# Patient Record
Sex: FEMALE | Race: WHITE | NOT HISPANIC OR LATINO | Employment: FULL TIME | URBAN - METROPOLITAN AREA
[De-identification: names, ages, dates, MRNs, and addresses within clinical notes are randomized per-mention and may not be internally consistent; named-entity substitution may affect disease eponyms.]

---

## 2023-07-21 LAB
CALCIDIOL (25 OH VITAMIN D3) (NG/ML) IN SER/PLAS: 46 NG/ML
COBALAMIN (VITAMIN B12) (PG/ML) IN SER/PLAS: 808 PG/ML (ref 211–911)
THYROTROPIN (MIU/L) IN SER/PLAS BY DETECTION LIMIT <= 0.05 MIU/L: 0.38 MIU/L (ref 0.44–3.98)
THYROXINE (T4) FREE (NG/DL) IN SER/PLAS: 0.91 NG/DL (ref 0.78–1.48)

## 2023-08-17 LAB
BASOPHILS (10*3/UL) IN BLOOD BY AUTOMATED COUNT: 0.02 X10E9/L (ref 0–0.1)
BASOPHILS/100 LEUKOCYTES IN BLOOD BY AUTOMATED COUNT: 0.5 % (ref 0–2)
EOSINOPHILS (10*3/UL) IN BLOOD BY AUTOMATED COUNT: 0.22 X10E9/L (ref 0–0.4)
EOSINOPHILS/100 LEUKOCYTES IN BLOOD BY AUTOMATED COUNT: 5.5 % (ref 0–6)
ERYTHROCYTE DISTRIBUTION WIDTH (RATIO) BY AUTOMATED COUNT: 12.6 % (ref 11.5–14.5)
ERYTHROCYTE MEAN CORPUSCULAR HEMOGLOBIN CONCENTRATION (G/DL) BY AUTOMATED: 32.6 G/DL (ref 32–36)
ERYTHROCYTE MEAN CORPUSCULAR VOLUME (FL) BY AUTOMATED COUNT: 103 FL (ref 80–100)
ERYTHROCYTES (10*6/UL) IN BLOOD BY AUTOMATED COUNT: 3.17 X10E12/L (ref 4–5.2)
HEMATOCRIT (%) IN BLOOD BY AUTOMATED COUNT: 32.5 % (ref 36–46)
HEMOGLOBIN (G/DL) IN BLOOD: 10.6 G/DL (ref 12–16)
IMMATURE GRANULOCYTES/100 LEUKOCYTES IN BLOOD BY AUTOMATED COUNT: 0.5 % (ref 0–0.9)
INR IN PPP BY COAGULATION ASSAY: 1.1 (ref 0.9–1.1)
LEUKOCYTES (10*3/UL) IN BLOOD BY AUTOMATED COUNT: 4 X10E9/L (ref 4.4–11.3)
LYMPHOCYTES (10*3/UL) IN BLOOD BY AUTOMATED COUNT: 1 X10E9/L (ref 0.8–3)
LYMPHOCYTES/100 LEUKOCYTES IN BLOOD BY AUTOMATED COUNT: 25.1 % (ref 13–44)
MONOCYTES (10*3/UL) IN BLOOD BY AUTOMATED COUNT: 0.65 X10E9/L (ref 0.05–0.8)
MONOCYTES/100 LEUKOCYTES IN BLOOD BY AUTOMATED COUNT: 16.3 % (ref 2–10)
NEUTROPHILS (10*3/UL) IN BLOOD BY AUTOMATED COUNT: 2.07 X10E9/L (ref 1.6–5.5)
NEUTROPHILS/100 LEUKOCYTES IN BLOOD BY AUTOMATED COUNT: 52.1 % (ref 40–80)
PLATELETS (10*3/UL) IN BLOOD AUTOMATED COUNT: 148 X10E9/L (ref 150–450)
PROTHROMBIN TIME (PT) IN PPP BY COAGULATION ASSAY: 12.1 SEC (ref 9.8–12.8)

## 2023-08-25 ENCOUNTER — HOSPITAL ENCOUNTER (OUTPATIENT)
Dept: DATA CONVERSION | Facility: HOSPITAL | Age: 73
End: 2023-08-25
Attending: STUDENT IN AN ORGANIZED HEALTH CARE EDUCATION/TRAINING PROGRAM | Admitting: STUDENT IN AN ORGANIZED HEALTH CARE EDUCATION/TRAINING PROGRAM
Payer: COMMERCIAL

## 2023-08-25 DIAGNOSIS — N39.3 STRESS INCONTINENCE (FEMALE) (MALE): ICD-10-CM

## 2023-09-09 LAB — URINE CULTURE: NORMAL

## 2023-09-29 VITALS — BODY MASS INDEX: 28.37 KG/M2 | HEIGHT: 67 IN | WEIGHT: 180.78 LBS

## 2023-09-30 NOTE — H&P
History & Physical Reviewed:   I have reviewed the History and Physical dated:  25-Aug-2023   History and Physical reviewed and relevant findings noted. Patient examined to review pertinent physical  findings.: No significant changes   Home Medications Reviewed: no changes noted   Allergies Reviewed: no changes noted       ERAS (Enhanced Recovery After Surgery):  ·  ERAS Patient: no     Consent:   COVID-19 Consent:  ·  COVID-19 Risk Consent Surgeon has reviewed key risks related to the risk of herman COVID-19 and if they contract COVID-19 what the risks are.     Attestation:   Note Completion:  I am a:  Resident/Fellow   Attending Attestation I saw and evaluated the patient.  I personally obtained the key and critical portions of the history and physical exam or was physically present for key and  critical portions performed by the resident/fellow. I reviewed the resident/fellow?s documentation and discussed the patient with the resident/fellow.  I agree with the resident/fellow?s medical decision making as documented in the note.     I personally evaluated the patient on 25-Aug-2023         Electronic Signatures:  Ana Martínez (Resident))  (Signed 24-Aug-2023 15:24)   Authored: History & Physical Reviewed, ERAS, Consent,  Note Completion  Stefan Jolly)  (Signed 25-Aug-2023 07:46)   Authored: Note Completion   Co-Signer: History & Physical Reviewed, ERAS, Consent, Note Completion      Last Updated: 25-Aug-2023 07:46 by Stefan Jolly)

## 2023-10-01 NOTE — OP NOTE
Post Operative Note:     PreOp Diagnosis: em   Post-Procedure Diagnosis: same   Procedure: 1. sling  2. cystoscopy   Surgeon: maryam   Resident/Fellow/Other Assistant: ofelia   Anesthesia: ga   Estimated Blood Loss (mL): 20   Specimen: no   Findings: normal bladder     Operative Report Dictated:  Dictation: not applicable - note contains Operative  Report   Operative Report:    Two sites were identified with each site 2.5 cm. from the midline at the level of the symphysis pubis. 1% lidocaine with epinephrine was injected vaginally under  the urethra in the vaginal epithelium and bilaterally in the direction of two periurethral tunnels that were about to be created for the sling.  At this point, a 2 cm. sagittal excision was performed vaginally, beginning 1 cm. beneath the urethral meatus.  Sharp dissection was carried out bilaterally using Metzenbaum scissors and periurethral tunnels were created bilaterally.  A Mai catheter with a catheter guide was inserted into the bladder.   The TVT trocars were passed vaginally and retropubically  with bladder deviation to the opposite side until it appeared suprapubically through a stab incision. The catheter was removed.  A cystoscopy was performed, which was entirely normal.  The bladder was drained.   The trocars were brought up entirely suprapubically  and excised.  The end of the mesh was held in place with a Ruth Ann clamp.   The end of the mesh was held in place with a Ruth Ann clamp. Tension was adjusted on the mesh by drawing up on the suprapubic ends with a #8 Hegar dilator maintained between the tape  and the urethra. The plastic sheaths were removed bilaterally.  The excess mesh was excised suprapubically.  The suprapubic sites were closed with interrupted stitches of 4-0 Vicryl suture.  The vaginal incision was closed with a running stitch of 0 Vicryl  suture.       Electronic Signatures:  Stefan Jolly)  (Signed 25-Aug-2023 14:51)   Authored: Post Operative  Note, Note Completion      Last Updated: 25-Aug-2023 14:51 by Stefan Jolly)

## 2023-10-01 NOTE — OP NOTE
Post Operative Note:     PreOp Diagnosis: stress urinary incontinence   Post-Procedure Diagnosis: same   Procedure: midurethral sling, cystoscopy   Surgeon: Dr Stefan Jolly   Resident/Fellow/Other Assistant: Dr. Ana Martínez   Anesthesia: general   I.V. Fluids: Per Anesthesia   Estimated Blood Loss (mL): 0   Blood Replacement: none   Specimen: no   Findings: stress incontinence     Operative Report Dictated:  Dictation: not applicable - note contains Operative  Report   Operative Report:    The patient was interviewed in the preop holding area by the surgical team, where the risks, benefits, and indication of the procedure were reviewed.  She was then  transferred to the operating suite where the patient was properly identified and the procedure was confirmed. When adequate anesthesia was obtained, the patient was prepped and draped in a dorsal lithotomy position. A time-out was performed. All were  in agreement. Preoperative antibiotics were administered. A gonsalez catheter was inserted under sterile conditions and the bladder was drained.    The Lonestar retractor was placed around the perineum. The anterior vaginal wall was infiltrated with lidocaine and a superficial incision starting approximately 1 cm inferior to the urethral meatus was carried out approximately 1.5 cm with a knife. From  superficial to deep, the anterior vaginal wall was then dissected from the tissue surrounding the urethra until the pubic bone was reached. This procedure was repeated on the opposite side. Two small knife holes were then created in the mons pubis just  cephalad and approximately 1.5 cm lateral to the pubic symphysis. The Desara slingtrocars were then inserted through the vaginal incision and were brought out through the mons incisions bilaterally. A cystoscopy was performed and no defects were noted  within the bladder wall. The cystoscope was removed and the mesh sling was then connected to the trocars and pulled through  the dissected space. After the sling was tensioned appropriately, the remainder of the mesh protruding from the mons pubis was  cut, leaving just two puncture holes on the mons pubis.  The anterior vaginal wall was then closed with a 2.0 vicryl suture. Dermabond was placed over the puncture holes located on the mons pubis.    The patient was then awakened from anesthesia, having tolerated procedure well, and was taken to the recovery room in stable condition. All sponge, needle, and instrument counts were correct at the end the case.     Attestation:   Note Completion:  I am a: Resident/Fellow   Attending Attestation I was present for the entire procedure          Electronic Signatures:  Ana Martínez (Resident))  (Signed 25-Aug-2023 14:50)   Authored: Post Operative Note, Note Completion  Stefan Jolly)  (Signed 27-Aug-2023 15:40)   Authored: Note Completion   Co-Signer: Post Operative Note, Note Completion      Last Updated: 27-Aug-2023 15:40 by Stefan Jolly)

## 2023-10-17 ENCOUNTER — APPOINTMENT (OUTPATIENT)
Dept: UROLOGY | Facility: CLINIC | Age: 73
End: 2023-10-17
Payer: COMMERCIAL

## 2023-11-17 ENCOUNTER — OFFICE VISIT (OUTPATIENT)
Dept: UROLOGY | Facility: CLINIC | Age: 73
End: 2023-11-17
Payer: COMMERCIAL

## 2023-11-17 ENCOUNTER — OFFICE VISIT (OUTPATIENT)
Dept: PRIMARY CARE | Facility: CLINIC | Age: 73
End: 2023-11-17
Payer: COMMERCIAL

## 2023-11-17 VITALS
RESPIRATION RATE: 16 BRPM | HEART RATE: 54 BPM | WEIGHT: 178.57 LBS | SYSTOLIC BLOOD PRESSURE: 126 MMHG | BODY MASS INDEX: 27.99 KG/M2 | DIASTOLIC BLOOD PRESSURE: 82 MMHG

## 2023-11-17 DIAGNOSIS — N39.3 SUI (STRESS URINARY INCONTINENCE, FEMALE): Primary | ICD-10-CM

## 2023-11-17 DIAGNOSIS — T45.1X5A ANEMIA DUE TO CHEMOTHERAPY: ICD-10-CM

## 2023-11-17 DIAGNOSIS — D64.81 ANEMIA DUE TO CHEMOTHERAPY: ICD-10-CM

## 2023-11-17 DIAGNOSIS — I10 ESSENTIAL (PRIMARY) HYPERTENSION: ICD-10-CM

## 2023-11-17 DIAGNOSIS — N32.81 OAB (OVERACTIVE BLADDER): ICD-10-CM

## 2023-11-17 DIAGNOSIS — M81.0 AGE-RELATED OSTEOPOROSIS WITHOUT CURRENT PATHOLOGICAL FRACTURE: Primary | ICD-10-CM

## 2023-11-17 DIAGNOSIS — R42 ORTHOSTATIC LIGHTHEADEDNESS: ICD-10-CM

## 2023-11-17 PROBLEM — E55.9 VITAMIN D DEFICIENCY: Status: ACTIVE | Noted: 2023-11-17

## 2023-11-17 PROBLEM — G47.00 INSOMNIA: Status: ACTIVE | Noted: 2023-11-17

## 2023-11-17 PROBLEM — F41.8 DEPRESSION WITH ANXIETY: Status: ACTIVE | Noted: 2023-11-17

## 2023-11-17 PROBLEM — N39.41 URGE INCONTINENCE: Status: ACTIVE | Noted: 2023-11-17

## 2023-11-17 PROBLEM — M54.30 SCIATICA: Status: ACTIVE | Noted: 2023-11-17

## 2023-11-17 PROBLEM — K21.9 GERD WITHOUT ESOPHAGITIS: Status: ACTIVE | Noted: 2023-11-17

## 2023-11-17 PROBLEM — E78.5 HYPERLIPIDEMIA: Status: ACTIVE | Noted: 2023-11-17

## 2023-11-17 PROBLEM — M62.838 MUSCLE SPASM: Status: ACTIVE | Noted: 2023-11-17

## 2023-11-17 PROBLEM — R25.2 LEG CRAMPS: Status: ACTIVE | Noted: 2023-11-17

## 2023-11-17 PROBLEM — M48.061 LUMBAR SPINAL STENOSIS: Status: ACTIVE | Noted: 2023-11-17

## 2023-11-17 PROBLEM — H52.203 ASTIGMATISM, BILATERAL: Status: ACTIVE | Noted: 2023-11-17

## 2023-11-17 PROBLEM — M85.852 OSTEOPENIA OF NECK OF LEFT FEMUR: Status: ACTIVE | Noted: 2023-11-17

## 2023-11-17 PROBLEM — E04.9 GOITER, NODULAR: Status: ACTIVE | Noted: 2023-11-17

## 2023-11-17 PROBLEM — R51.9 HEADACHE: Status: ACTIVE | Noted: 2023-11-17

## 2023-11-17 PROBLEM — E28.39 HYPOESTROGENISM: Status: ACTIVE | Noted: 2023-11-17

## 2023-11-17 PROBLEM — M19.049 HAND ARTHRITIS: Status: ACTIVE | Noted: 2023-11-17

## 2023-11-17 PROBLEM — M17.9 OA (OSTEOARTHRITIS) OF KNEE: Status: ACTIVE | Noted: 2023-11-17

## 2023-11-17 PROBLEM — M54.40 LOW BACK PAIN WITH SCIATICA: Status: ACTIVE | Noted: 2023-11-17

## 2023-11-17 PROBLEM — M17.11 PRIMARY OSTEOARTHRITIS OF RIGHT KNEE: Status: ACTIVE | Noted: 2023-11-17

## 2023-11-17 PROBLEM — M54.41 ACUTE RIGHT-SIDED LOW BACK PAIN WITH RIGHT-SIDED SCIATICA: Status: ACTIVE | Noted: 2023-11-17

## 2023-11-17 PROBLEM — M54.9 BACK PAIN: Status: ACTIVE | Noted: 2023-11-17

## 2023-11-17 PROCEDURE — 99214 OFFICE O/P EST MOD 30 MIN: CPT | Performed by: INTERNAL MEDICINE

## 2023-11-17 PROCEDURE — 99024 POSTOP FOLLOW-UP VISIT: CPT | Performed by: STUDENT IN AN ORGANIZED HEALTH CARE EDUCATION/TRAINING PROGRAM

## 2023-11-17 PROCEDURE — 3079F DIAST BP 80-89 MM HG: CPT | Performed by: INTERNAL MEDICINE

## 2023-11-17 PROCEDURE — 1125F AMNT PAIN NOTED PAIN PRSNT: CPT | Performed by: STUDENT IN AN ORGANIZED HEALTH CARE EDUCATION/TRAINING PROGRAM

## 2023-11-17 PROCEDURE — 3074F SYST BP LT 130 MM HG: CPT | Performed by: INTERNAL MEDICINE

## 2023-11-17 PROCEDURE — 1125F AMNT PAIN NOTED PAIN PRSNT: CPT | Performed by: INTERNAL MEDICINE

## 2023-11-17 RX ORDER — RISEDRONATE SODIUM 35 MG/1
35 TABLET, FILM COATED ORAL
COMMUNITY
Start: 2019-07-18 | End: 2024-01-12 | Stop reason: ALTCHOICE

## 2023-11-17 RX ORDER — LISINOPRIL 40 MG/1
TABLET ORAL
Qty: 90 TABLET | Refills: 1 | Status: SHIPPED | OUTPATIENT
Start: 2023-11-17

## 2023-11-17 RX ORDER — METOPROLOL SUCCINATE 50 MG/1
TABLET, EXTENDED RELEASE ORAL
COMMUNITY
Start: 2023-04-08

## 2023-11-17 RX ORDER — FAMOTIDINE 20 MG/1
20 TABLET, FILM COATED ORAL DAILY
COMMUNITY
End: 2024-01-12

## 2023-11-17 RX ORDER — TRETINOIN 0.25 MG/G
CREAM TOPICAL NIGHTLY
COMMUNITY
Start: 2017-02-06

## 2023-11-17 RX ORDER — AMLODIPINE BESYLATE 10 MG/1
10 TABLET ORAL EVERY 12 HOURS
COMMUNITY
Start: 2023-09-30 | End: 2023-11-17 | Stop reason: ENTERED-IN-ERROR

## 2023-11-17 RX ORDER — AMLODIPINE BESYLATE 5 MG/1
5 TABLET ORAL DAILY
COMMUNITY
End: 2023-11-17 | Stop reason: ENTERED-IN-ERROR

## 2023-11-17 RX ORDER — AMLODIPINE BESYLATE 5 MG/1
5 TABLET ORAL 2 TIMES DAILY
COMMUNITY
End: 2024-03-25 | Stop reason: WASHOUT

## 2023-11-17 RX ORDER — ESCITALOPRAM OXALATE 10 MG/1
10 TABLET ORAL EVERY EVENING
COMMUNITY

## 2023-11-17 RX ORDER — CEPHRADINE 500 MG
CAPSULE ORAL
COMMUNITY
Start: 2019-07-18

## 2023-11-17 RX ORDER — VIT C/E/ZN/COPPR/LUTEIN/ZEAXAN 250MG-90MG
CAPSULE ORAL
COMMUNITY
Start: 2017-06-29

## 2023-11-17 RX ORDER — SOY PROTEIN
POWDER (GRAM) ORAL
COMMUNITY
Start: 2019-03-07

## 2023-11-17 ASSESSMENT — ENCOUNTER SYMPTOMS
DIZZINESS: 1
DEPRESSION: 0
FATIGUE: 1
OCCASIONAL FEELINGS OF UNSTEADINESS: 1
LOSS OF SENSATION IN FEET: 0

## 2023-11-17 ASSESSMENT — PATIENT HEALTH QUESTIONNAIRE - PHQ9
2. FEELING DOWN, DEPRESSED OR HOPELESS: NOT AT ALL
SUM OF ALL RESPONSES TO PHQ9 QUESTIONS 1 AND 2: 0
1. LITTLE INTEREST OR PLEASURE IN DOING THINGS: NOT AT ALL

## 2023-11-17 NOTE — PATIENT INSTRUCTIONS
Please staggered bp meds as follow 6 am and 6 pm amlodipine 5 mg,   9 am and 8 pm bed time metoprolol 25 mg, and ONLY at lunch break and  4 pm,your lisinopril 20 mg with large amount of water.Completebone density in [facility], do not take calcium for 24 before test, after stop your weekly medication. Start slow fe otc daily, prevent constipation with stool softener  Return here in 1m

## 2023-11-17 NOTE — PROGRESS NOTES
CHIEF COMPLAINT:  POV 12 weeks             HISTORY OF PRESENT ILLNESS:   Patient is here for a 12 weeks post-operative visit. Patient complains of having stress incontinence. She states she leaks when she stands up from a seated position. She has no sensation when she needs to urinate. She can be walking around the house and she will just start leaking and cannot stop it. She reports her bladder will empty completely and she does not get any urge. She has started wearing pads and Depends. She tried Myrbetriq with no improvement and she got a dry mouth side effect from using it. Patient noticed the most improvement for her bladder symptoms right after her surgery 12 weeks ago. Patient has finished her chemotherapy treatment. 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                 HISTORY OF PRESENT ILLNESS:           Past Medical History  She has a past medical history of Acute candidiasis of vulva and vagina, Acute vaginitis (04/21/2016), COVID-19 (12/01/2020), Localized swelling, mass and lump, left lower limb (02/15/2019), Other bursal cyst, unspecified site (03/07/2019), Other conditions influencing health status (06/06/2013), Other neuromuscular dysfunction of bladder (06/20/2013), Pain in right knee (12/23/2015), Personal history of diseases of the blood and blood-forming organs and certain disorders involving the immune mechanism (12/10/2015), Personal history of other diseases of the circulatory system (04/07/2016), Personal history of other diseases of the musculoskeletal system and connective tissue (06/20/2013), Personal history of other diseases of the respiratory system (11/18/2020), Personal history of other mental and behavioral disorders (01/14/2021), Personal history of other specified conditions (04/07/2016), Personal history of other specified conditions (12/01/2020), Personal history of other specified conditions (12/10/2015), Personal history of urinary (tract) infections (08/30/2018), Preglaucoma,  "unspecified, bilateral (2015), Presence of spectacles and contact lenses (2015), Primary open-angle glaucoma, bilateral, mild stage (10/12/2015), Primary open-angle glaucoma, bilateral, mild stage (10/12/2015), Primary open-angle glaucoma, bilateral, mild stage (10/12/2015), Primary open-angle glaucoma, bilateral, mild stage (10/13/2015), Primary open-angle glaucoma, unspecified eye, stage unspecified (10/13/2015), and Right lower quadrant pain (2015).    Surgical History  She has a past surgical history that includes  section, classic (2013); Other surgical history (2021); CT guided percutaneous biopsy bone deep (2023); and CT guided percutaneous biopsy bone deep (2/10/2023).     Social History  She reports that she has never smoked. She has never used smokeless tobacco. No history on file for alcohol use and drug use.    Family History  No family history on file.     Allergies  Codeine      A comprehensive 10+ review of systems was negative except for: see hpi                          PHYSICAL EXAMINATION:  BP Readings from Last 3 Encounters:   23 126/82   23 110/60   23 152/76      Wt Readings from Last 3 Encounters:   23 81 kg (178 lb 9.2 oz)   23 83.9 kg (185 lb)   23 83.9 kg (185 lb)      BMI: Estimated body mass index is 27.99 kg/m² as calculated from the following:    Height as of 23: 1.701 m (5' 6.97\").    Weight as of an earlier encounter on 23: 81 kg (178 lb 9.2 oz).  BSA: Estimated body surface area is 1.96 meters squared as calculated from the following:    Height as of 23: 1.701 m (5' 6.97\").    Weight as of an earlier encounter on 23: 81 kg (178 lb 9.2 oz).  HEENT: Normocephalic, atraumatic, PER EOMI, nonicteric, trachea normal, thyroid normal, oropharynx normal.  CARDIAC: regular rate & rhythm, S1 & S2 normal.  No heaves, thrills, gallops or murmurs.  LUNGS: Clear to auscultation, no spinal or CV " tenderness.  EXTREMITIES: No evidence of cyanosis, clubbing or edema.                   Assessment:    73-year-old with mixed incontinence, positive CST     CARISA     -failed botox 100 units, detrol, myrbetriq (STM)  -UDS shows em, normal emptying   recovering well after surgery, having some urinary urge incontinence  No improvement with repeat trial of myrbetriq         Stress incontinence:  S/p sling 8/23, still having issues with UI, no sensation, worse at night  Repeat UDS     Begin timed voiding every 2 - 3 hours  Drink no more than 8 oz. fluids per hour      Stefan Jolly MD

## 2023-12-15 ENCOUNTER — APPOINTMENT (OUTPATIENT)
Dept: UROLOGY | Facility: CLINIC | Age: 73
End: 2023-12-15
Payer: COMMERCIAL

## 2023-12-21 ENCOUNTER — LAB (OUTPATIENT)
Dept: LAB | Facility: HOSPITAL | Age: 73
End: 2023-12-21
Payer: COMMERCIAL

## 2023-12-21 ENCOUNTER — OFFICE VISIT (OUTPATIENT)
Dept: HEMATOLOGY/ONCOLOGY | Facility: HOSPITAL | Age: 73
End: 2023-12-21
Payer: COMMERCIAL

## 2023-12-21 ENCOUNTER — APPOINTMENT (OUTPATIENT)
Dept: UROLOGY | Facility: CLINIC | Age: 73
End: 2023-12-21
Payer: COMMERCIAL

## 2023-12-21 VITALS
DIASTOLIC BLOOD PRESSURE: 72 MMHG | HEIGHT: 66 IN | OXYGEN SATURATION: 99 % | HEART RATE: 59 BPM | TEMPERATURE: 97.2 F | BODY MASS INDEX: 29.17 KG/M2 | SYSTOLIC BLOOD PRESSURE: 155 MMHG | RESPIRATION RATE: 17 BRPM | WEIGHT: 181.5 LBS

## 2023-12-21 DIAGNOSIS — C83.38 DIFFUSE LARGE B-CELL LYMPHOMA OF LYMPH NODES OF MULTIPLE REGIONS (MULTI): ICD-10-CM

## 2023-12-21 DIAGNOSIS — C83.38 DIFFUSE LARGE B-CELL LYMPHOMA OF LYMPH NODES OF MULTIPLE REGIONS (MULTI): Primary | ICD-10-CM

## 2023-12-21 LAB
ALBUMIN SERPL BCP-MCNC: 4.2 G/DL (ref 3.4–5)
ALP SERPL-CCNC: 60 U/L (ref 33–136)
ALT SERPL W P-5'-P-CCNC: 12 U/L (ref 7–45)
ANION GAP SERPL CALC-SCNC: 11 MMOL/L (ref 10–20)
AST SERPL W P-5'-P-CCNC: 13 U/L (ref 9–39)
BASOPHILS # BLD AUTO: 0.02 X10*3/UL (ref 0–0.1)
BASOPHILS NFR BLD AUTO: 0.4 %
BILIRUB SERPL-MCNC: 0.4 MG/DL (ref 0–1.2)
BUN SERPL-MCNC: 18 MG/DL (ref 6–23)
CALCIUM SERPL-MCNC: 9.3 MG/DL (ref 8.6–10.3)
CHLORIDE SERPL-SCNC: 109 MMOL/L (ref 98–107)
CO2 SERPL-SCNC: 25 MMOL/L (ref 21–32)
CREAT SERPL-MCNC: 0.6 MG/DL (ref 0.5–1.05)
EOSINOPHIL # BLD AUTO: 0.25 X10*3/UL (ref 0–0.4)
EOSINOPHIL NFR BLD AUTO: 4.8 %
ERYTHROCYTE [DISTWIDTH] IN BLOOD BY AUTOMATED COUNT: 14.7 % (ref 11.5–14.5)
GFR SERPL CREATININE-BSD FRML MDRD: >90 ML/MIN/1.73M*2
GLUCOSE SERPL-MCNC: 100 MG/DL (ref 74–99)
HCT VFR BLD AUTO: 37.7 % (ref 36–46)
HGB BLD-MCNC: 12.2 G/DL (ref 12–16)
IMM GRANULOCYTES # BLD AUTO: 0.02 X10*3/UL (ref 0–0.5)
IMM GRANULOCYTES NFR BLD AUTO: 0.4 % (ref 0–0.9)
LDH SERPL L TO P-CCNC: 146 U/L (ref 84–246)
LYMPHOCYTES # BLD AUTO: 1.02 X10*3/UL (ref 0.8–3)
LYMPHOCYTES NFR BLD AUTO: 19.5 %
MCH RBC QN AUTO: 29.2 PG (ref 26–34)
MCHC RBC AUTO-ENTMCNC: 32.4 G/DL (ref 32–36)
MCV RBC AUTO: 90 FL (ref 80–100)
MONOCYTES # BLD AUTO: 0.67 X10*3/UL (ref 0.05–0.8)
MONOCYTES NFR BLD AUTO: 12.8 %
NEUTROPHILS # BLD AUTO: 3.24 X10*3/UL (ref 1.6–5.5)
NEUTROPHILS NFR BLD AUTO: 62.1 %
NRBC BLD-RTO: 0 /100 WBCS (ref 0–0)
PLATELET # BLD AUTO: 201 X10*3/UL (ref 150–450)
POTASSIUM SERPL-SCNC: 3.9 MMOL/L (ref 3.5–5.3)
PROT SERPL-MCNC: 6.4 G/DL (ref 6.4–8.2)
RBC # BLD AUTO: 4.18 X10*6/UL (ref 4–5.2)
SODIUM SERPL-SCNC: 141 MMOL/L (ref 136–145)
WBC # BLD AUTO: 5.2 X10*3/UL (ref 4.4–11.3)

## 2023-12-21 PROCEDURE — 3078F DIAST BP <80 MM HG: CPT | Performed by: INTERNAL MEDICINE

## 2023-12-21 PROCEDURE — 36415 COLL VENOUS BLD VENIPUNCTURE: CPT

## 2023-12-21 PROCEDURE — 1126F AMNT PAIN NOTED NONE PRSNT: CPT | Performed by: INTERNAL MEDICINE

## 2023-12-21 PROCEDURE — 99214 OFFICE O/P EST MOD 30 MIN: CPT | Performed by: INTERNAL MEDICINE

## 2023-12-21 PROCEDURE — 85025 COMPLETE CBC W/AUTO DIFF WBC: CPT

## 2023-12-21 PROCEDURE — 3077F SYST BP >= 140 MM HG: CPT | Performed by: INTERNAL MEDICINE

## 2023-12-21 PROCEDURE — 84075 ASSAY ALKALINE PHOSPHATASE: CPT

## 2023-12-21 PROCEDURE — 83615 LACTATE (LD) (LDH) ENZYME: CPT

## 2023-12-21 ASSESSMENT — PAIN SCALES - GENERAL: PAINLEVEL: 0-NO PAIN

## 2023-12-21 ASSESSMENT — ENCOUNTER SYMPTOMS
DEPRESSION: 0
LOSS OF SENSATION IN FEET: 1
OCCASIONAL FEELINGS OF UNSTEADINESS: 0

## 2023-12-21 NOTE — PROGRESS NOTES
Patient ID: Sunni Hearn is a 73 y.o. female.    Subjective    The patient has a long history of joint and back pain, and possible autoimmune disease. In November 2022, she experience pain in the right hip, which was  new. Over the next few months, it progressed to cause moderate to severe pain in the right foot, leg, thigh, and hip, most severely affecting the foot. She was given various treatments, including gabapentin, roboxin, steroid. However, none has helped.  In addition she underwent CT, MRI, and later PET/CT to work up the pain (Imani Llanes, Rajat, Drake). Notably, PET/CT on 1/19/2023 shows multiple FDG avid lymph nodes, soft tissue mass, osseous foci, and spleen. These were suspicious for malignancy,  particularly lymphoma. A biopsy was done on Jan 30, 2023,  on BONE, ILIAC, RIGHT, which was FDG avid. An initial biopsy of the bone was not diagnostic. She subsequently had another biopsy for a preverbebral  mass. Final diagnosis is DLBCL. FISH tests for BCL2 and MYC translocations are pending. Stage IV. IPI 3 (age, extranodal sites, stage).      She initiated therapy with  R-CHOP on 3/3/2023. Had significant nausea and vomiting for the first 3 days post chemo, relieved with iv Zofran. subsequently treatments were well tolerated. No  dose adjustment was needed. C6 was given on 6/16/2023.       today she has neuropathy in her fingertips and bottoms of her feet (not in her toes). She is having difficulty picking up small objects and dropping items. This remains unchanged.     Continues to report urinary incontinence. Was evaluated by Urology who recommended bladder mesh surgery. She has consented to this option and will have the procedure on 8/25.     She had a IT on 8/18. The procedure was not well tolerated, and she is not willing to have more. CSF results shows no evidence of lymphoma.      Today she states she has improved. Numbness in the right foot and fingertips are improving without meds. Denies  "fever, chills, night sweats, weight loss, headaches, visual disturbances, hearing changes,  chest pain, dyspnea, cough, sputum production, hemoptysis, palpitations, syncope/near-syncope, peripheral edema, nausea/vomiting/diarrhea, constipation, abdominal pain, melena, hematochezia, hematuria, noticeable lymphadenopathy, rash, dizziness, and  balance/gait disturbance.     Treatment synopsis: [facility] 3/3 to 6/16/2023            Objective    BSA: 1.97 meters squared  /72   Pulse 59   Temp 36.2 °C (97.2 °F) (Skin)   Resp 17   Ht (S) 1.689 m (5' 6.5\")   Wt 82.3 kg (181 lb 8 oz)   SpO2 99%   BMI 28.86 kg/m²      Physical Exam  Constitutional:       General: She is not in acute distress.     Appearance: She is not toxic-appearing.   HENT:      Head: Normocephalic.      Nose: Nose normal.      Mouth/Throat:      Mouth: Mucous membranes are moist.   Eyes:      Extraocular Movements: Extraocular movements intact.      Pupils: Pupils are equal, round, and reactive to light.   Cardiovascular:      Rate and Rhythm: Normal rate and regular rhythm.      Heart sounds: No murmur heard.  Pulmonary:      Effort: Pulmonary effort is normal.      Breath sounds: Normal breath sounds.   Abdominal:      General: Bowel sounds are normal.      Palpations: Abdomen is soft. There is no mass.      Tenderness: There is no abdominal tenderness. There is no rebound.   Musculoskeletal:         General: No swelling, tenderness, deformity or signs of injury.      Right lower leg: No edema.      Left lower leg: No edema.   Skin:     Coloration: Skin is not jaundiced.      Findings: No bruising, lesion or rash.   Neurological:      Mental Status: She is alert and oriented to person, place, and time.      Cranial Nerves: No cranial nerve deficit.      Motor: No weakness.      Gait: Gait normal.   Psychiatric:         Mood and Affect: Mood normal.         Performance Status:  Asymptomatic      Assessment/Plan   Newly diagnosed lymphoma    "   1-Lymphoma   Final diagnosis is DLBCL. FISH tests for BCL2 and MYC translocations are pending.   Stage IV.   IPI 3 (age, extranodal sites, stage).  Reviewed the results, natural history of DLBCL. stressed it is very treatable, and potentially curable in up to 70% patients.   Discussed PET/CT results. It's a CR. Very encouraging news. Will need follow up PET/CT q6mon.  2-Prognosis  Her cure potential is favorable, based on the above.   3-treatment  The goal is cure. The treatment recommended is R-CHOP x 6 cycles. given q3wks. She will need GCSF and other supportive care.   AEs of drugs explained in details. In particular, the following possible AEs were discussed: severe infection, death, PML, HBV reactivation, cardiac injury, renal injury, mucositis, neuropathy (sensory, motor, autonomic), secondary tumors including leukemia.  These may happen at various frequency. will make best efforts to monitor, prevent, and treat AEs.    c1 was associated with nausea and vomiting. Pt should take zofran for 5 days at the scheduled twice a day doses, instead of the as needed dose schedule.  C2 given on 3/24/2023 was well tolerated. G1 sensory neuropathy was reported. No need to adjust vinca dose.  C3 given 4/14/23  C4 given 5/5/23 Vincristine dose reduced with C4 for grade 2 neuropathy.   C5 given 5/26/23   C6 given 6/16/23  Discussed IT therapy. Overall the AE is rare and is well tolerated. She consented and will start treatment on 8/18 and 9/11. He will need CT head, and labs before these. She received one, but is not willing to have 2nd. CSF on 8/18 was negative, with no evidence of lymphoma.      4-Peripheral neuropathy  -More severe after chemo. Off gabapentin. Improving.         Plan:   -PET/CT in Jan 2024.  -RTC after PET.    Time spent > 35 min, with more than 50% on counseling and coordinating care.    Cancer Staging   No matching staging information was found for the patient.    Oncology History    No history exists.                  Celestino Rawls MD PhD

## 2023-12-28 ENCOUNTER — APPOINTMENT (OUTPATIENT)
Dept: UROLOGY | Facility: CLINIC | Age: 73
End: 2023-12-28
Payer: COMMERCIAL

## 2024-01-04 ENCOUNTER — PROCEDURE VISIT (OUTPATIENT)
Dept: UROLOGY | Facility: CLINIC | Age: 74
End: 2024-01-04
Payer: COMMERCIAL

## 2024-01-04 DIAGNOSIS — N39.41 URGE INCONTINENCE: ICD-10-CM

## 2024-01-04 PROCEDURE — 51728 CYSTOMETROGRAM W/VP: CPT | Performed by: STUDENT IN AN ORGANIZED HEALTH CARE EDUCATION/TRAINING PROGRAM

## 2024-01-04 PROCEDURE — 51741 ELECTRO-UROFLOWMETRY FIRST: CPT | Performed by: STUDENT IN AN ORGANIZED HEALTH CARE EDUCATION/TRAINING PROGRAM

## 2024-01-04 PROCEDURE — 51797 INTRAABDOMINAL PRESSURE TEST: CPT | Performed by: STUDENT IN AN ORGANIZED HEALTH CARE EDUCATION/TRAINING PROGRAM

## 2024-01-04 PROCEDURE — 51784 ANAL/URINARY MUSCLE STUDY: CPT | Performed by: STUDENT IN AN ORGANIZED HEALTH CARE EDUCATION/TRAINING PROGRAM

## 2024-01-04 NOTE — PROGRESS NOTES
Urodynamic Study:  Sunni Hearn identified using 2 forms of identification. A timeout was completed, patient is in the correct position and all safety precautions have been taken.   Risks, Benefits and Alternatives:  Risks, benefits and alternatives were discussed in detail. The patient appears to understand and agrees to proceed. Sunni Hearn has completed, signed and dated the procedure consent form.    Uroflow completed.  Using sterile technique, a 7fr Air Charge Catheter was inserted into the bladder. Rectal catheter inserted approximately 15cm  Bladder filled with normal saline at a rate of 49.9ml/min 297.0 ml of normal saline instilled in bladder prior to voiding.   Results:  Post void residual  (PVR)  volume of 30 ml.      Patient here for urodynamic study. Discussed procedure. Patient has complaints of stress incontinence. She does leak when she stands. Sometimes she has no sensation to void and can empty her bladder with out knowing she is doing so. Performed UDS without difficulty. Patient did leak with stress test. She had discomfort when she reached a strong desire and that is how she knew she had a strong desire to void but said the normal feeling you would get she does not have anymore and it got worse after finishing chemo. Instructed patient to increase fluid intake.

## 2024-01-05 ENCOUNTER — APPOINTMENT (OUTPATIENT)
Dept: HEMATOLOGY/ONCOLOGY | Facility: HOSPITAL | Age: 74
End: 2024-01-05
Payer: COMMERCIAL

## 2024-01-05 ENCOUNTER — ANCILLARY PROCEDURE (OUTPATIENT)
Dept: RADIOLOGY | Facility: CLINIC | Age: 74
End: 2024-01-05
Payer: COMMERCIAL

## 2024-01-05 DIAGNOSIS — C83.38 DIFFUSE LARGE B-CELL LYMPHOMA OF LYMPH NODES OF MULTIPLE REGIONS (MULTI): ICD-10-CM

## 2024-01-05 PROCEDURE — A9552 F18 FDG: HCPCS | Performed by: INTERNAL MEDICINE

## 2024-01-05 PROCEDURE — 78816 PET IMAGE W/CT FULL BODY: CPT | Mod: PET TUMOR SUBSQ TX STRATEGY | Performed by: STUDENT IN AN ORGANIZED HEALTH CARE EDUCATION/TRAINING PROGRAM

## 2024-01-05 PROCEDURE — 3430000001 HC RX 343 DIAGNOSTIC RADIOPHARMACEUTICALS: Performed by: INTERNAL MEDICINE

## 2024-01-05 PROCEDURE — 78815 PET IMAGE W/CT SKULL-THIGH: CPT | Mod: PS

## 2024-01-05 RX ORDER — FLUDEOXYGLUCOSE F 18 200 MCI/ML
13.64 INJECTION, SOLUTION INTRAVENOUS
Status: COMPLETED | OUTPATIENT
Start: 2024-01-05 | End: 2024-01-05

## 2024-01-05 RX ADMIN — FLUDEOXYGLUCOSE F 18 13.64 MILLICURIE: 200 INJECTION, SOLUTION INTRAVENOUS at 11:18

## 2024-01-08 PROBLEM — N39.3 SUI (STRESS URINARY INCONTINENCE, FEMALE): Status: ACTIVE | Noted: 2024-01-04

## 2024-01-12 ENCOUNTER — APPOINTMENT (OUTPATIENT)
Dept: PRIMARY CARE | Facility: CLINIC | Age: 74
End: 2024-01-12
Payer: COMMERCIAL

## 2024-01-12 ENCOUNTER — OFFICE VISIT (OUTPATIENT)
Dept: PRIMARY CARE | Facility: CLINIC | Age: 74
End: 2024-01-12
Payer: COMMERCIAL

## 2024-01-12 VITALS
RESPIRATION RATE: 15 BRPM | HEART RATE: 68 BPM | HEIGHT: 66 IN | SYSTOLIC BLOOD PRESSURE: 130 MMHG | DIASTOLIC BLOOD PRESSURE: 72 MMHG | WEIGHT: 180.78 LBS | OXYGEN SATURATION: 95 % | BODY MASS INDEX: 29.05 KG/M2

## 2024-01-12 DIAGNOSIS — M81.8 OTHER OSTEOPOROSIS WITHOUT CURRENT PATHOLOGICAL FRACTURE: ICD-10-CM

## 2024-01-12 DIAGNOSIS — I10 BENIGN ESSENTIAL HYPERTENSION: Primary | ICD-10-CM

## 2024-01-12 DIAGNOSIS — F41.8 DEPRESSION WITH ANXIETY: ICD-10-CM

## 2024-01-12 DIAGNOSIS — D64.81 ANEMIA DUE TO CHEMOTHERAPY: ICD-10-CM

## 2024-01-12 DIAGNOSIS — Z00.00 WELLNESS EXAMINATION: ICD-10-CM

## 2024-01-12 DIAGNOSIS — T45.1X5A ANEMIA DUE TO CHEMOTHERAPY: ICD-10-CM

## 2024-01-12 PROBLEM — R42 ORTHOSTATIC LIGHTHEADEDNESS: Status: RESOLVED | Noted: 2023-11-17 | Resolved: 2024-01-12

## 2024-01-12 PROCEDURE — 3078F DIAST BP <80 MM HG: CPT | Performed by: INTERNAL MEDICINE

## 2024-01-12 PROCEDURE — G0439 PPPS, SUBSEQ VISIT: HCPCS | Performed by: INTERNAL MEDICINE

## 2024-01-12 PROCEDURE — 1126F AMNT PAIN NOTED NONE PRSNT: CPT | Performed by: INTERNAL MEDICINE

## 2024-01-12 PROCEDURE — 3075F SYST BP GE 130 - 139MM HG: CPT | Performed by: INTERNAL MEDICINE

## 2024-01-12 PROCEDURE — 1170F FXNL STATUS ASSESSED: CPT | Performed by: INTERNAL MEDICINE

## 2024-01-12 PROCEDURE — 99214 OFFICE O/P EST MOD 30 MIN: CPT | Performed by: INTERNAL MEDICINE

## 2024-01-12 RX ORDER — RISEDRONATE SODIUM 35 MG/1
35 TABLET, FILM COATED ORAL
Qty: 4 TABLET | Refills: 1 | Status: SHIPPED | OUTPATIENT
Start: 2024-01-12 | End: 2024-02-05 | Stop reason: ALTCHOICE

## 2024-01-12 ASSESSMENT — ACTIVITIES OF DAILY LIVING (ADL)
BATHING: INDEPENDENT
TAKING_MEDICATION: INDEPENDENT
DOING_HOUSEWORK: INDEPENDENT
DRESSING: INDEPENDENT
MANAGING_FINANCES: INDEPENDENT
GROCERY_SHOPPING: INDEPENDENT

## 2024-01-12 ASSESSMENT — COLUMBIA-SUICIDE SEVERITY RATING SCALE - C-SSRS
2. HAVE YOU ACTUALLY HAD ANY THOUGHTS OF KILLING YOURSELF?: NO
6. HAVE YOU EVER DONE ANYTHING, STARTED TO DO ANYTHING, OR PREPARED TO DO ANYTHING TO END YOUR LIFE?: NO
1. IN THE PAST MONTH, HAVE YOU WISHED YOU WERE DEAD OR WISHED YOU COULD GO TO SLEEP AND NOT WAKE UP?: NO

## 2024-01-12 ASSESSMENT — ENCOUNTER SYMPTOMS
OCCASIONAL FEELINGS OF UNSTEADINESS: 0
BRUISES/BLEEDS EASILY: 0
SHORTNESS OF BREATH: 0
DEPRESSION: 0
AGITATION: 0
PALPITATIONS: 0
BLOOD IN STOOL: 0
SLEEP DISTURBANCE: 0
LOSS OF SENSATION IN FEET: 0

## 2024-01-12 NOTE — PROGRESS NOTES
"Subjective   Patient ID: Sunni Hearn is a 73 y.o. female who presents for Dizziness.bp, new medical issues.    HPI   She is working 2 days / week.  Since last visit in November we decreased and stagered bp meds, currently only 20 mg lisinopril, amlodipine 5 bid and metoprolol suc 25 bid., positional ligheadeness has resolved.  She also started slow fe daily for 2 m, her fatigue is improved but not at base line.  Her distal finger and toes neuropathy is also improving. Her long term memory has decreased.  Pt is concerned about decreased in vision, oncologist consider related to large dose of prednisone. Pending visit in 2 weeks..  [place] for urogyn (sling ) surgery on February.  Lymphoma stable as per most reent PET  in dec 21, 2023    Review of Systems   Respiratory:  Negative for shortness of breath.    Cardiovascular:  Negative for palpitations.   Gastrointestinal:  Negative for blood in stool.        Heartburn asymptomatic with PPI daily   Hematological:  Does not bruise/bleed easily.   Psychiatric/Behavioral:  Negative for agitation and sleep disturbance.        Objective   /72 (BP Location: Right arm, Patient Position: Sitting, BP Cuff Size: Adult)   Pulse 68   Resp 15   Ht 1.676 m (5' 6\")   Wt 82 kg (180 lb 12.4 oz)   SpO2 95%   BMI 29.18 kg/m²     Physical Exam  Eyes - conjunctivae clear, PERRLA  Wearing wick  Neck - no cervical lymphadenopathy, no thyromegaly  Axilla - no palpable lymphadenopathy  Cardiac- regular rate and rhythm, no murmurs, no carotid bruit, no JVP  Lung - clear to auscultation, no rales, no rhonchi, no wheezing  GI - normally active bowel sounds, non tender, non distended, no hepatosplenomegaly, no rebound  MSK - non deformities  Extremities - no edema, good distal pulses  Neuro - bilateral thumb numbness, , oriented x 3  Skin - no bruises, no rashes  Psychiatric - pleasant, well groom, no hallucinations      Assessment/Plan   Problem List Items Addressed This Visit       "       ICD-10-CM       Cardiac and Vasculature    Benign essential hypertension - Primary I10     Continue current meds, stagering them in 24 h            Endocrine/Metabolic    Other osteoporosis without current pathological fracture M81.8     Refill biphosphonates til she complete dexa, then she is aware to stop         Relevant Medications    risedronate (ActoneL) 35 mg tablet       Health Encounters    Wellness examination Z00.00     Discussed diet, exercise, immunizations, and screening appropriate for their age.  Colonoscopy: 2020 next in 10 years ?  Dexa: pending before stop biphosphonates.  Mammogram: August 2023                Hematology and Neoplasia    Anemia due to chemotherapy D64.81, T45.1X5A    Relevant Orders    Iron and TIBC    Ferritin       Mental Health    Depression with anxiety F41.8     Stable continue re eval in 6m

## 2024-01-12 NOTE — ASSESSMENT & PLAN NOTE
>>ASSESSMENT AND PLAN FOR BENIGN ESSENTIAL HYPERTENSION WRITTEN ON 1/12/2024 12:36 PM BY YOAN DOAN MD    Continue current meds, stagering them in 24 h

## 2024-01-12 NOTE — ASSESSMENT & PLAN NOTE
Discussed diet, exercise, immunizations, and screening appropriate for their age.  Colonoscopy: 2020 next in 10 years ?  Dexa: pending before stop biphosphonates.  Mammogram: August 2023

## 2024-01-13 DIAGNOSIS — U07.1 COVID: Primary | ICD-10-CM

## 2024-01-13 RX ORDER — NIRMATRELVIR AND RITONAVIR 150-100 MG
2 KIT ORAL 2 TIMES DAILY
Qty: 20 TABLET | Refills: 0 | Status: SHIPPED | OUTPATIENT
Start: 2024-01-13 | End: 2024-01-18

## 2024-01-13 NOTE — PROGRESS NOTES
73-year-old female called Saturday morning tested positive for COVID she has high fever she has been vaccinated and boosted she has a history of non-Hodgkin's lymphoma in remission and hypertension.  We talked about the side effects of Paxlovid and she is willing to start.  I also made her primary care aware since she was just in to see her

## 2024-01-19 ENCOUNTER — APPOINTMENT (OUTPATIENT)
Dept: HEMATOLOGY/ONCOLOGY | Facility: HOSPITAL | Age: 74
End: 2024-01-19
Payer: COMMERCIAL

## 2024-01-24 ENCOUNTER — OFFICE VISIT (OUTPATIENT)
Dept: OPHTHALMOLOGY | Facility: CLINIC | Age: 74
End: 2024-01-24
Payer: COMMERCIAL

## 2024-01-24 DIAGNOSIS — H52.203 MYOPIA OF BOTH EYES WITH ASTIGMATISM AND PRESBYOPIA: ICD-10-CM

## 2024-01-24 DIAGNOSIS — H25.043 POSTERIOR SUBCAPSULAR POLAR AGE-RELATED CATARACT, BILATERAL: ICD-10-CM

## 2024-01-24 DIAGNOSIS — H40.1131 PRIMARY OPEN ANGLE GLAUCOMA (POAG) OF BOTH EYES, MILD STAGE: Primary | ICD-10-CM

## 2024-01-24 DIAGNOSIS — H52.13 MYOPIA OF BOTH EYES WITH ASTIGMATISM AND PRESBYOPIA: ICD-10-CM

## 2024-01-24 DIAGNOSIS — H52.4 MYOPIA OF BOTH EYES WITH ASTIGMATISM AND PRESBYOPIA: ICD-10-CM

## 2024-01-24 PROBLEM — H40.1111 PRIMARY OPEN-ANGLE GLAUCOMA, RIGHT EYE, MILD STAGE: Status: ACTIVE | Noted: 2024-01-24

## 2024-01-24 PROCEDURE — 92133 CPTRZD OPH DX IMG PST SGM ON: CPT | Performed by: OPTOMETRIST

## 2024-01-24 PROCEDURE — 92015 DETERMINE REFRACTIVE STATE: CPT | Performed by: OPTOMETRIST

## 2024-01-24 PROCEDURE — 92014 COMPRE OPH EXAM EST PT 1/>: CPT | Performed by: OPTOMETRIST

## 2024-01-24 RX ORDER — BRIMONIDINE TARTRATE 2 MG/ML
1 SOLUTION/ DROPS OPHTHALMIC 2 TIMES DAILY
Qty: 15 ML | Refills: 11 | Status: SHIPPED | OUTPATIENT
Start: 2024-01-24 | End: 2024-05-03 | Stop reason: WASHOUT

## 2024-01-24 ASSESSMENT — ENCOUNTER SYMPTOMS
PSYCHIATRIC NEGATIVE: 0
CONSTITUTIONAL NEGATIVE: 0
NEUROLOGICAL NEGATIVE: 0
EYES NEGATIVE: 0
RESPIRATORY NEGATIVE: 0
ALLERGIC/IMMUNOLOGIC NEGATIVE: 1
GASTROINTESTINAL NEGATIVE: 0
ENDOCRINE NEGATIVE: 0
HEMATOLOGIC/LYMPHATIC NEGATIVE: 0
MUSCULOSKELETAL NEGATIVE: 0
CARDIOVASCULAR NEGATIVE: 1

## 2024-01-24 ASSESSMENT — REFRACTION_WEARINGRX
OS_ADD: +2.50
OD_CYLINDER: -0.50
OD_ADD: +2.50
OD_SPHERE: -2.50
OS_AXIS: 110
OD_AXIS: 025
OS_SPHERE: -0.75
OS_CYLINDER: -1.00

## 2024-01-24 ASSESSMENT — PACHYMETRY
OD_CT(UM): 594
OS_CT(UM): 585

## 2024-01-24 ASSESSMENT — REFRACTION_MANIFEST
OD_SPHERE: -5.25
OS_ADD: +2.50
OS_CYLINDER: -0.75
OD_ADD: +2.50
OS_SPHERE: -3.00
OS_AXIS: 135
OD_CYLINDER: SPHERE

## 2024-01-24 ASSESSMENT — VISUAL ACUITY
OS_PH_CC: 20/40
OD_BAT_HIGH: 20/40
OD_PH_CC: 20/40
CORRECTION_TYPE: GLASSES
OD_CC: 20/60
METHOD: SNELLEN - LINEAR
OS_PH_CC+: +1
OD_PH_CC+: +2
OS_CC+: +1
OS_CC: 20/50
OS_BAT_HIGH: 20/30

## 2024-01-24 ASSESSMENT — CUP TO DISC RATIO
OS_RATIO: .7
OD_RATIO: .7

## 2024-01-24 ASSESSMENT — CONF VISUAL FIELD
OS_INFERIOR_TEMPORAL_RESTRICTION: 0
OS_INFERIOR_NASAL_RESTRICTION: 0
OS_SUPERIOR_NASAL_RESTRICTION: 0
OS_SUPERIOR_TEMPORAL_RESTRICTION: 0

## 2024-01-24 ASSESSMENT — EXTERNAL EXAM - LEFT EYE: OS_EXAM: NORMAL

## 2024-01-24 ASSESSMENT — TONOMETRY
OD_IOP_MMHG: 18
IOP_METHOD: GOLDMANN APPLANATION
OS_IOP_MMHG: 18

## 2024-01-24 ASSESSMENT — EXTERNAL EXAM - RIGHT EYE: OD_EXAM: NORMAL

## 2024-01-24 NOTE — H&P (VIEW-ONLY)
POAG OD. Brimonidine 0.2% OU QD and using intermittently. Had bradycardia from timolol. IOP 16/19 Tmax 18/18 pachy adjust -2 OU. Has stopped steroids for cancer treatment.    Optical coherence tomography of the retinal nerve fiber layer (RNFL) revealed:    OD: Reduced thickness in superior sectors with an average RNFL thickness of 76 was 78 micron.   OS: Reduced thickness in superior sectors with an average RNFL thickness of 75 was 77 micron.   A Beavers 24-2 threshold visual field test was done.  Results were:   OD: the absence of scotoma, pattern standard deviation 2.20 dB   OS: the absence of scotoma, pattern standard deviation 3.01 dB  PSC cataracts worsening. VA corrects to 20/30 OD and 20/20 OS. BAT 20/40 OD 20/30 OS. A spectacle prescription was dispensed to be used as needed. Recommend to defer getting new glasses. Patient drives to work [place] and [place].   Hx of plaquenil therapy but DCd and no need for testing.   Non hodgkin lymphoma in remission.   Dermatochalasis OS.OD and pt is symptomatic.   Per patient request get glasses and not spend a lot of money on them had large refractive change and use only for driving.    RTc 4 months for manifest refraction (MR) and BAT and IOP and OCT RNFL

## 2024-01-25 ENCOUNTER — LAB (OUTPATIENT)
Dept: LAB | Facility: HOSPITAL | Age: 74
End: 2024-01-25
Payer: COMMERCIAL

## 2024-01-25 ENCOUNTER — OFFICE VISIT (OUTPATIENT)
Dept: HEMATOLOGY/ONCOLOGY | Facility: HOSPITAL | Age: 74
End: 2024-01-25
Payer: COMMERCIAL

## 2024-01-25 VITALS
OXYGEN SATURATION: 99 % | HEART RATE: 75 BPM | BODY MASS INDEX: 29.14 KG/M2 | RESPIRATION RATE: 20 BRPM | TEMPERATURE: 97.9 F | DIASTOLIC BLOOD PRESSURE: 77 MMHG | SYSTOLIC BLOOD PRESSURE: 154 MMHG | WEIGHT: 180.56 LBS

## 2024-01-25 DIAGNOSIS — C83.38 DIFFUSE LARGE B-CELL LYMPHOMA OF LYMPH NODES OF MULTIPLE REGIONS (MULTI): ICD-10-CM

## 2024-01-25 DIAGNOSIS — D64.81 ANEMIA DUE TO CHEMOTHERAPY: ICD-10-CM

## 2024-01-25 DIAGNOSIS — T45.1X5A ANEMIA DUE TO CHEMOTHERAPY: ICD-10-CM

## 2024-01-25 LAB
ALBUMIN SERPL BCP-MCNC: 4.2 G/DL (ref 3.4–5)
ALP SERPL-CCNC: 63 U/L (ref 33–136)
ALT SERPL W P-5'-P-CCNC: 12 U/L (ref 7–45)
ANION GAP SERPL CALC-SCNC: 11 MMOL/L (ref 10–20)
AST SERPL W P-5'-P-CCNC: 12 U/L (ref 9–39)
BASOPHILS # BLD AUTO: 0.03 X10*3/UL (ref 0–0.1)
BASOPHILS NFR BLD AUTO: 0.6 %
BILIRUB SERPL-MCNC: 0.3 MG/DL (ref 0–1.2)
BUN SERPL-MCNC: 21 MG/DL (ref 6–23)
CALCIUM SERPL-MCNC: 9.6 MG/DL (ref 8.6–10.3)
CHLORIDE SERPL-SCNC: 107 MMOL/L (ref 98–107)
CO2 SERPL-SCNC: 26 MMOL/L (ref 21–32)
CREAT SERPL-MCNC: 0.6 MG/DL (ref 0.5–1.05)
EGFRCR SERPLBLD CKD-EPI 2021: >90 ML/MIN/1.73M*2
EOSINOPHIL # BLD AUTO: 0.27 X10*3/UL (ref 0–0.4)
EOSINOPHIL NFR BLD AUTO: 5.4 %
ERYTHROCYTE [DISTWIDTH] IN BLOOD BY AUTOMATED COUNT: 14.3 % (ref 11.5–14.5)
FERRITIN SERPL-MCNC: 49 NG/ML (ref 8–150)
GLUCOSE SERPL-MCNC: 134 MG/DL (ref 74–99)
HCT VFR BLD AUTO: 36.7 % (ref 36–46)
HGB BLD-MCNC: 11.8 G/DL (ref 12–16)
IMM GRANULOCYTES # BLD AUTO: 0.04 X10*3/UL (ref 0–0.5)
IMM GRANULOCYTES NFR BLD AUTO: 0.8 % (ref 0–0.9)
IRON SATN MFR SERPL: 19 % (ref 25–45)
IRON SERPL-MCNC: 78 UG/DL (ref 35–150)
LDH SERPL L TO P-CCNC: 145 U/L (ref 84–246)
LYMPHOCYTES # BLD AUTO: 1.02 X10*3/UL (ref 0.8–3)
LYMPHOCYTES NFR BLD AUTO: 20.2 %
MCH RBC QN AUTO: 28.6 PG (ref 26–34)
MCHC RBC AUTO-ENTMCNC: 32.2 G/DL (ref 32–36)
MCV RBC AUTO: 89 FL (ref 80–100)
MONOCYTES # BLD AUTO: 0.58 X10*3/UL (ref 0.05–0.8)
MONOCYTES NFR BLD AUTO: 11.5 %
NEUTROPHILS # BLD AUTO: 3.1 X10*3/UL (ref 1.6–5.5)
NEUTROPHILS NFR BLD AUTO: 61.5 %
NRBC BLD-RTO: 0 /100 WBCS (ref 0–0)
PLATELET # BLD AUTO: 188 X10*3/UL (ref 150–450)
POTASSIUM SERPL-SCNC: 3.6 MMOL/L (ref 3.5–5.3)
PROT SERPL-MCNC: 6.5 G/DL (ref 6.4–8.2)
RBC # BLD AUTO: 4.13 X10*6/UL (ref 4–5.2)
SODIUM SERPL-SCNC: 140 MMOL/L (ref 136–145)
TIBC SERPL-MCNC: 420 UG/DL (ref 240–445)
UIBC SERPL-MCNC: 342 UG/DL (ref 110–370)
WBC # BLD AUTO: 5 X10*3/UL (ref 4.4–11.3)

## 2024-01-25 PROCEDURE — 85025 COMPLETE CBC W/AUTO DIFF WBC: CPT

## 2024-01-25 PROCEDURE — 1159F MED LIST DOCD IN RCRD: CPT | Performed by: INTERNAL MEDICINE

## 2024-01-25 PROCEDURE — 1160F RVW MEDS BY RX/DR IN RCRD: CPT | Performed by: INTERNAL MEDICINE

## 2024-01-25 PROCEDURE — 3077F SYST BP >= 140 MM HG: CPT | Performed by: INTERNAL MEDICINE

## 2024-01-25 PROCEDURE — 99214 OFFICE O/P EST MOD 30 MIN: CPT | Performed by: INTERNAL MEDICINE

## 2024-01-25 PROCEDURE — 1126F AMNT PAIN NOTED NONE PRSNT: CPT | Performed by: INTERNAL MEDICINE

## 2024-01-25 PROCEDURE — 83540 ASSAY OF IRON: CPT

## 2024-01-25 PROCEDURE — 82728 ASSAY OF FERRITIN: CPT

## 2024-01-25 PROCEDURE — 36415 COLL VENOUS BLD VENIPUNCTURE: CPT

## 2024-01-25 PROCEDURE — 83615 LACTATE (LD) (LDH) ENZYME: CPT

## 2024-01-25 PROCEDURE — 1036F TOBACCO NON-USER: CPT | Performed by: INTERNAL MEDICINE

## 2024-01-25 PROCEDURE — 80053 COMPREHEN METABOLIC PANEL: CPT

## 2024-01-25 PROCEDURE — 3078F DIAST BP <80 MM HG: CPT | Performed by: INTERNAL MEDICINE

## 2024-01-25 ASSESSMENT — PAIN SCALES - GENERAL: PAINLEVEL: 0-NO PAIN

## 2024-01-31 ENCOUNTER — TELEPHONE (OUTPATIENT)
Dept: PRIMARY CARE | Facility: CLINIC | Age: 74
End: 2024-01-31
Payer: COMMERCIAL

## 2024-02-07 NOTE — PROGRESS NOTES
Patient ID: Sunni Hearn is a 73 y.o. female.    Subjective    The patient has a long history of joint and back pain, and possible autoimmune disease. In November 2022, she experience pain in the right hip, which was  new. Over the next few months, it progressed to cause moderate to severe pain in the right foot, leg, thigh, and hip, most severely affecting the foot. She was given various treatments, including gabapentin, roboxin, steroid. However, none has helped.  In addition she underwent CT, MRI, and later PET/CT to work up the pain (Imani Llanes, Rajat, [place]). Notably, PET/CT on 1/19/2023 shows multiple FDG avid lymph nodes, soft tissue mass, osseous foci, and spleen. These were suspicious for malignancy,  particularly lymphoma. A biopsy was done on Jan 30, 2023,  on BONE, ILIAC, RIGHT, which was FDG avid. An initial biopsy of the bone was not diagnostic. She subsequently had another biopsy for a preverbebral  mass. Final diagnosis is DLBCL. FISH tests for BCL2 and MYC translocations are pending. Stage IV. IPI 3 (age, extranodal sites, stage).      She initiated therapy with  R-CHOP on 3/3/2023. Had significant nausea and vomiting for the first 3 days post chemo, relieved with iv Zofran. subsequently treatments were well tolerated. No  dose adjustment was needed. C6 was given on 6/16/2023.       today she has neuropathy in her fingertips and bottoms of her feet (not in her toes). She is having difficulty picking up small objects and dropping items. This remains unchanged.     Continues to report urinary incontinence. Was evaluated by Urology who recommended bladder mesh surgery. She has consented to this option and will have the procedure on 8/25.     She had a IT on 8/18. The procedure was not well tolerated, and she is not willing to have more. CSF results shows no evidence of lymphoma.      Today she states she has improved. Numbness in the right foot and fingertips are improving without meds. Denies  fever, chills, night sweats, weight loss, headaches, visual disturbances, hearing changes,  chest pain, dyspnea, cough, sputum production, hemoptysis, palpitations, syncope/near-syncope, peripheral edema, nausea/vomiting/diarrhea, constipation, abdominal pain, melena, hematochezia, hematuria, noticeable lymphadenopathy, rash, dizziness, and  balance/gait disturbance.     Treatment synopsis: [facility] 3/3 to 6/16/2023            Objective    BSA: 1.95 meters squared  /77 (BP Location: Left arm, Patient Position: Sitting, BP Cuff Size: Adult)   Pulse 75   Temp 36.6 °C (97.9 °F) (Temporal)   Resp 20   Wt 81.9 kg (180 lb 8.9 oz)   SpO2 99%   BMI 29.14 kg/m²      Physical Exam  Constitutional:       General: She is not in acute distress.     Appearance: She is not toxic-appearing.   HENT:      Head: Normocephalic.      Nose: Nose normal.      Mouth/Throat:      Mouth: Mucous membranes are moist.   Eyes:      Extraocular Movements: Extraocular movements intact.      Pupils: Pupils are equal, round, and reactive to light.   Cardiovascular:      Rate and Rhythm: Normal rate and regular rhythm.      Heart sounds: No murmur heard.  Pulmonary:      Effort: Pulmonary effort is normal.      Breath sounds: Normal breath sounds.   Abdominal:      General: Bowel sounds are normal.      Palpations: Abdomen is soft. There is no mass.      Tenderness: There is no abdominal tenderness. There is no rebound.   Musculoskeletal:         General: No swelling, tenderness, deformity or signs of injury.      Right lower leg: No edema.      Left lower leg: No edema.   Skin:     Coloration: Skin is not jaundiced.      Findings: No bruising, lesion or rash.   Neurological:      Mental Status: She is alert and oriented to person, place, and time.      Cranial Nerves: No cranial nerve deficit.      Motor: No weakness.      Gait: Gait normal.   Psychiatric:         Mood and Affect: Mood normal.         Performance  Status:  Asymptomatic      Assessment/Plan   Newly diagnosed lymphoma      1-Lymphoma   Final diagnosis is DLBCL. FISH tests for BCL2 and MYC translocations are pending.   Stage IV.   IPI 3 (age, extranodal sites, stage).  Reviewed the results, natural history of DLBCL. stressed it is very treatable, and potentially curable in up to 70% patients.   Discussed PET/CT results. It's a CR. Very encouraging news. Will need follow up PET/CT q6mon.  -discussed results of PET done on jan 5, 2024. It confirms CR. Next PET/CT will be due on July 2024.     2-Prognosis  Her cure potential is favorable, based on the above.   3-treatment  The goal is cure. The treatment recommended is R-CHOP x 6 cycles. given q3wks. She will need GCSF and other supportive care.   AEs of drugs explained in details. In particular, the following possible AEs were discussed: severe infection, death, PML, HBV reactivation, cardiac injury, renal injury, mucositis, neuropathy (sensory, motor, autonomic), secondary tumors including leukemia.  These may happen at various frequency. will make best efforts to monitor, prevent, and treat AEs.    c1 was associated with nausea and vomiting. Pt should take zofran for 5 days at the scheduled twice a day doses, instead of the as needed dose schedule.  C2 given on 3/24/2023 was well tolerated. G1 sensory neuropathy was reported. No need to adjust vinca dose.  C3 given 4/14/23  C4 given 5/5/23 Vincristine dose reduced with C4 for grade 2 neuropathy.   C5 given 5/26/23   C6 given 6/16/23  Discussed IT therapy. Overall the AE is rare and is well tolerated. She consented and will start treatment on 8/18 and 9/11. He will need CT head, and labs before these. She received one, but is not willing to have 2nd. CSF on 8/18 was negative, with no evidence of lymphoma.      4-Peripheral neuropathy  -More severe after chemo. Off gabapentin. Improving.         Plan:   -PET/CT in July 2024.  -RTC 3 mon.   -labs.     Time spent >  35 min, with more than 50% on counseling and coordinating care.     Cancer Staging   No matching staging information was found for the patient.    Oncology History    No history exists.                 Celestino Rawls MD PhD

## 2024-02-09 ENCOUNTER — HOSPITAL ENCOUNTER (OUTPATIENT)
Dept: RADIOLOGY | Facility: CLINIC | Age: 74
Discharge: HOME | End: 2024-02-09
Payer: COMMERCIAL

## 2024-02-09 DIAGNOSIS — M81.0 AGE-RELATED OSTEOPOROSIS WITHOUT CURRENT PATHOLOGICAL FRACTURE: ICD-10-CM

## 2024-02-09 PROCEDURE — 77080 DXA BONE DENSITY AXIAL: CPT | Performed by: STUDENT IN AN ORGANIZED HEALTH CARE EDUCATION/TRAINING PROGRAM

## 2024-02-09 PROCEDURE — 77080 DXA BONE DENSITY AXIAL: CPT

## 2024-02-14 ENCOUNTER — TELEMEDICINE CLINICAL SUPPORT (OUTPATIENT)
Dept: PREADMISSION TESTING | Facility: HOSPITAL | Age: 74
End: 2024-02-14
Payer: COMMERCIAL

## 2024-02-14 RX ORDER — IBUPROFEN 800 MG/1
800 TABLET ORAL EVERY 8 HOURS PRN
COMMUNITY

## 2024-02-14 NOTE — PREPROCEDURE INSTRUCTIONS
Medication List            Accurate as of February 14, 2024  1:40 PM. Always use your most recent med list.                amLODIPine 5 mg tablet  Commonly known as: Norvasc     * brimonidine 0.2 % ophthalmic solution  Commonly known as: AlphaGAN  Administer 1 drop into both eyes 2 times a day.     * brimonidine 0.2 % ophthalmic solution  Commonly known as: AlphaGAN  Administer 1 drop into both eyes 2 times a day.     cholecalciferol 25 MCG (1000 UT) capsule  Commonly known as: Vitamin D-3     cranberry conc-ascorbic acid 6,000-100 mg capsule     escitalopram 10 mg tablet  Commonly known as: Lexapro     ibuprofen 800 mg tablet     lisinopril 40 mg tablet  1/2 tab q day     metoprolol succinate XL 50 mg 24 hr tablet  Commonly known as: Toprol-XL     tretinoin 0.025 % cream  Commonly known as: Retin-A     vitamin B12-folic acid 500-400 mcg tablet           * This list has 2 medication(s) that are the same as other medications prescribed for you. Read the directions carefully, and ask your doctor or other care provider to review them with you.                          NPO Instructions:     Do not drink any liquid after midnight the night before your surgery  Do not eat any food after midnight the night before your surgery/procedure.  Candy, gum, mints and smoking of cigarettes, marijuana or vaping is not permitted after midnight prior to your surgery   Do not drink Alcohol 24 hours prior to surgery      Increase fluid intake day before surgery    Additional Instructions:      Review your medication instructions, take indicated medications.  Take Amlodipine, Metoprolol. And Lisinopril the morning of surgery with a sip of water.     Wear  comfortable loose fitting clothing  Do not use moisturizers, creams, lotions or perfume  All jewelry and valuables should be left at home. May bring glasses and partials.    Stop blood thinning medications as instructed by ordering physician or surgeon. Stop Ibuprofen 1 week prior to  surgery.     Shower or bathe the night before or day of surgery.   Brush teeth and avoid perfumes, colognes, powders, makeup, aftershave and hair spray    Go to Registration, in the main lobby, upon arrival on the day of surgery and have 's license and medical insurance card available.    Call [phone] the day before your surgery/procedure to find out what time you are to arrive the next day.     Please have a responsible adult to drive you home and be available to help you as needed after surgery.        NPO Instructions:    Do not eat any food after midnight the night before your surgery/procedure.

## 2024-02-18 ENCOUNTER — ANESTHESIA EVENT (OUTPATIENT)
Dept: OPERATING ROOM | Facility: HOSPITAL | Age: 74
End: 2024-02-18
Payer: COMMERCIAL

## 2024-02-21 ENCOUNTER — TELEPHONE (OUTPATIENT)
Dept: PRIMARY CARE | Facility: CLINIC | Age: 74
End: 2024-02-21
Payer: COMMERCIAL

## 2024-02-23 ENCOUNTER — PHARMACY VISIT (OUTPATIENT)
Dept: PHARMACY | Facility: CLINIC | Age: 74
End: 2024-02-23

## 2024-02-23 ENCOUNTER — APPOINTMENT (OUTPATIENT)
Dept: CARDIOLOGY | Facility: HOSPITAL | Age: 74
End: 2024-02-23
Payer: COMMERCIAL

## 2024-02-23 ENCOUNTER — HOSPITAL ENCOUNTER (OUTPATIENT)
Facility: HOSPITAL | Age: 74
Setting detail: OUTPATIENT SURGERY
Discharge: HOME | End: 2024-02-23
Attending: STUDENT IN AN ORGANIZED HEALTH CARE EDUCATION/TRAINING PROGRAM | Admitting: STUDENT IN AN ORGANIZED HEALTH CARE EDUCATION/TRAINING PROGRAM
Payer: COMMERCIAL

## 2024-02-23 ENCOUNTER — ANESTHESIA (OUTPATIENT)
Dept: OPERATING ROOM | Facility: HOSPITAL | Age: 74
End: 2024-02-23
Payer: COMMERCIAL

## 2024-02-23 VITALS
DIASTOLIC BLOOD PRESSURE: 87 MMHG | HEART RATE: 67 BPM | OXYGEN SATURATION: 95 % | BODY MASS INDEX: 28.25 KG/M2 | RESPIRATION RATE: 20 BRPM | WEIGHT: 180 LBS | TEMPERATURE: 97.5 F | SYSTOLIC BLOOD PRESSURE: 167 MMHG | HEIGHT: 67 IN

## 2024-02-23 DIAGNOSIS — N39.3 SUI (STRESS URINARY INCONTINENCE, FEMALE): Primary | ICD-10-CM

## 2024-02-23 PROBLEM — Z87.39 PERSONAL HISTORY OF OTHER DISEASES OF THE MUSCULOSKELETAL SYSTEM AND CONNECTIVE TISSUE: Status: ACTIVE | Noted: 2024-02-23

## 2024-02-23 PROBLEM — B37.31 ACUTE CANDIDIASIS OF VULVA AND VAGINA: Status: ACTIVE | Noted: 2024-02-23

## 2024-02-23 PROBLEM — Z86.2 PERSONAL HISTORY OF DISEASES OF THE BLOOD AND BLOOD-FORMING ORGANS AND CERTAIN DISORDERS INVOLVING THE IMMUNE MECHANISM: Status: ACTIVE | Noted: 2024-02-23

## 2024-02-23 PROBLEM — Z87.09 PERSONAL HISTORY OF OTHER DISEASES OF THE RESPIRATORY SYSTEM: Status: ACTIVE | Noted: 2024-02-23

## 2024-02-23 PROBLEM — M25.561 PAIN IN RIGHT KNEE: Status: ACTIVE | Noted: 2024-02-23

## 2024-02-23 PROBLEM — M71.30 OTHER BURSAL CYST, UNSPECIFIED SITE: Status: ACTIVE | Noted: 2024-02-23

## 2024-02-23 PROBLEM — H40.003 PREGLAUCOMA, UNSPECIFIED, BILATERAL: Status: ACTIVE | Noted: 2024-02-23

## 2024-02-23 PROBLEM — Z86.59 PERSONAL HISTORY OF OTHER MENTAL AND BEHAVIORAL DISORDERS: Status: ACTIVE | Noted: 2024-02-23

## 2024-02-23 PROBLEM — N31.8 OTHER NEUROMUSCULAR DYSFUNCTION OF BLADDER: Status: ACTIVE | Noted: 2024-02-23

## 2024-02-23 PROBLEM — Z87.898 PERSONAL HISTORY OF OTHER SPECIFIED CONDITIONS: Status: ACTIVE | Noted: 2024-02-23

## 2024-02-23 PROBLEM — R22.42 LOCALIZED SWELLING, MASS AND LUMP, LEFT LOWER LIMB: Status: ACTIVE | Noted: 2024-02-23

## 2024-02-23 PROBLEM — D64.9 ANEMIA: Status: ACTIVE | Noted: 2024-02-23

## 2024-02-23 PROBLEM — C85.90 NON HODGKIN'S LYMPHOMA (MULTI): Status: ACTIVE | Noted: 2024-02-23

## 2024-02-23 PROBLEM — N76.0 ACUTE VAGINITIS: Status: ACTIVE | Noted: 2024-02-23

## 2024-02-23 PROBLEM — U07.1 COVID-19: Status: ACTIVE | Noted: 2024-02-23

## 2024-02-23 PROBLEM — Z86.79 PERSONAL HISTORY OF OTHER DISEASES OF THE CIRCULATORY SYSTEM: Status: ACTIVE | Noted: 2024-02-23

## 2024-02-23 PROBLEM — Z87.440 PERSONAL HISTORY OF URINARY (TRACT) INFECTIONS: Status: ACTIVE | Noted: 2024-02-23

## 2024-02-23 PROBLEM — Z97.3 PRESENCE OF SPECTACLES AND CONTACT LENSES: Status: ACTIVE | Noted: 2024-02-23

## 2024-02-23 PROBLEM — F41.9 ANXIETY: Status: ACTIVE | Noted: 2024-02-23

## 2024-02-23 LAB
ERYTHROCYTE [DISTWIDTH] IN BLOOD BY AUTOMATED COUNT: 14.4 % (ref 11.5–14.5)
HCT VFR BLD AUTO: 34 % (ref 36–46)
HGB BLD-MCNC: 11.6 G/DL (ref 12–16)
MCH RBC QN AUTO: 29.7 PG (ref 26–34)
MCHC RBC AUTO-ENTMCNC: 34.1 G/DL (ref 32–36)
MCV RBC AUTO: 87 FL (ref 80–100)
NRBC BLD-RTO: 0 /100 WBCS (ref 0–0)
PLATELET # BLD AUTO: 188 X10*3/UL (ref 150–450)
RBC # BLD AUTO: 3.91 X10*6/UL (ref 4–5.2)
WBC # BLD AUTO: 4.3 X10*3/UL (ref 4.4–11.3)

## 2024-02-23 PROCEDURE — 2500000001 HC RX 250 WO HCPCS SELF ADMINISTERED DRUGS (ALT 637 FOR MEDICARE OP): Performed by: ANESTHESIOLOGY

## 2024-02-23 PROCEDURE — C1771 REP DEV, URINARY, W/SLING: HCPCS | Performed by: STUDENT IN AN ORGANIZED HEALTH CARE EDUCATION/TRAINING PROGRAM

## 2024-02-23 PROCEDURE — 93010 ELECTROCARDIOGRAM REPORT: CPT | Performed by: STUDENT IN AN ORGANIZED HEALTH CARE EDUCATION/TRAINING PROGRAM

## 2024-02-23 PROCEDURE — 88302 TISSUE EXAM BY PATHOLOGIST: CPT | Performed by: PATHOLOGY

## 2024-02-23 PROCEDURE — 2780000003 HC OR 278 NO HCPCS: Performed by: STUDENT IN AN ORGANIZED HEALTH CARE EDUCATION/TRAINING PROGRAM

## 2024-02-23 PROCEDURE — 3600000003 HC OR TIME - INITIAL BASE CHARGE - PROCEDURE LEVEL THREE: Performed by: STUDENT IN AN ORGANIZED HEALTH CARE EDUCATION/TRAINING PROGRAM

## 2024-02-23 PROCEDURE — 2500000004 HC RX 250 GENERAL PHARMACY W/ HCPCS (ALT 636 FOR OP/ED): Performed by: ANESTHESIOLOGY

## 2024-02-23 PROCEDURE — 3700000002 HC GENERAL ANESTHESIA TIME - EACH INCREMENTAL 1 MINUTE: Performed by: STUDENT IN AN ORGANIZED HEALTH CARE EDUCATION/TRAINING PROGRAM

## 2024-02-23 PROCEDURE — 57287 REVISE/REMOVE SLING REPAIR: CPT | Performed by: STUDENT IN AN ORGANIZED HEALTH CARE EDUCATION/TRAINING PROGRAM

## 2024-02-23 PROCEDURE — 2500000001 HC RX 250 WO HCPCS SELF ADMINISTERED DRUGS (ALT 637 FOR MEDICARE OP): Performed by: STUDENT IN AN ORGANIZED HEALTH CARE EDUCATION/TRAINING PROGRAM

## 2024-02-23 PROCEDURE — 7100000009 HC PHASE TWO TIME - INITIAL BASE CHARGE: Performed by: STUDENT IN AN ORGANIZED HEALTH CARE EDUCATION/TRAINING PROGRAM

## 2024-02-23 PROCEDURE — 3700000001 HC GENERAL ANESTHESIA TIME - INITIAL BASE CHARGE: Performed by: STUDENT IN AN ORGANIZED HEALTH CARE EDUCATION/TRAINING PROGRAM

## 2024-02-23 PROCEDURE — 2500000004 HC RX 250 GENERAL PHARMACY W/ HCPCS (ALT 636 FOR OP/ED)

## 2024-02-23 PROCEDURE — 7100000002 HC RECOVERY ROOM TIME - EACH INCREMENTAL 1 MINUTE: Performed by: STUDENT IN AN ORGANIZED HEALTH CARE EDUCATION/TRAINING PROGRAM

## 2024-02-23 PROCEDURE — 7100000001 HC RECOVERY ROOM TIME - INITIAL BASE CHARGE: Performed by: STUDENT IN AN ORGANIZED HEALTH CARE EDUCATION/TRAINING PROGRAM

## 2024-02-23 PROCEDURE — 2500000005 HC RX 250 GENERAL PHARMACY W/O HCPCS

## 2024-02-23 PROCEDURE — 2720000007 HC OR 272 NO HCPCS: Performed by: STUDENT IN AN ORGANIZED HEALTH CARE EDUCATION/TRAINING PROGRAM

## 2024-02-23 PROCEDURE — 85027 COMPLETE CBC AUTOMATED: CPT | Performed by: ANESTHESIOLOGY

## 2024-02-23 PROCEDURE — 2500000004 HC RX 250 GENERAL PHARMACY W/ HCPCS (ALT 636 FOR OP/ED): Performed by: STUDENT IN AN ORGANIZED HEALTH CARE EDUCATION/TRAINING PROGRAM

## 2024-02-23 PROCEDURE — 3600000008 HC OR TIME - EACH INCREMENTAL 1 MINUTE - PROCEDURE LEVEL THREE: Performed by: STUDENT IN AN ORGANIZED HEALTH CARE EDUCATION/TRAINING PROGRAM

## 2024-02-23 PROCEDURE — 0751T DGTZ GLS MCRSCP SLD LEVEL II: CPT | Mod: TC,PORLAB | Performed by: STUDENT IN AN ORGANIZED HEALTH CARE EDUCATION/TRAINING PROGRAM

## 2024-02-23 PROCEDURE — 2500000005 HC RX 250 GENERAL PHARMACY W/O HCPCS: Performed by: STUDENT IN AN ORGANIZED HEALTH CARE EDUCATION/TRAINING PROGRAM

## 2024-02-23 PROCEDURE — RXMED WILLOW AMBULATORY MEDICATION CHARGE

## 2024-02-23 PROCEDURE — 36415 COLL VENOUS BLD VENIPUNCTURE: CPT | Performed by: ANESTHESIOLOGY

## 2024-02-23 PROCEDURE — 7100000010 HC PHASE TWO TIME - EACH INCREMENTAL 1 MINUTE: Performed by: STUDENT IN AN ORGANIZED HEALTH CARE EDUCATION/TRAINING PROGRAM

## 2024-02-23 PROCEDURE — 93005 ELECTROCARDIOGRAM TRACING: CPT | Mod: 59

## 2024-02-23 RX ORDER — FAMOTIDINE 10 MG/ML
20 INJECTION INTRAVENOUS ONCE
Status: COMPLETED | OUTPATIENT
Start: 2024-02-23 | End: 2024-02-23

## 2024-02-23 RX ORDER — LIDOCAINE HCL/PF 100 MG/5ML
SYRINGE (ML) INTRAVENOUS AS NEEDED
Status: DISCONTINUED | OUTPATIENT
Start: 2024-02-23 | End: 2024-02-23

## 2024-02-23 RX ORDER — ACETAMINOPHEN 500 MG
1000 TABLET ORAL EVERY 6 HOURS PRN
Qty: 20 TABLET | Refills: 0 | Status: SHIPPED | OUTPATIENT
Start: 2024-02-23 | End: 2024-02-28

## 2024-02-23 RX ORDER — DEXAMETHASONE SODIUM PHOSPHATE 4 MG/ML
INJECTION, SOLUTION INTRA-ARTICULAR; INTRALESIONAL; INTRAMUSCULAR; INTRAVENOUS; SOFT TISSUE AS NEEDED
Status: DISCONTINUED | OUTPATIENT
Start: 2024-02-23 | End: 2024-02-23

## 2024-02-23 RX ORDER — ONDANSETRON HYDROCHLORIDE 2 MG/ML
INJECTION, SOLUTION INTRAVENOUS AS NEEDED
Status: DISCONTINUED | OUTPATIENT
Start: 2024-02-23 | End: 2024-02-23

## 2024-02-23 RX ORDER — ALBUTEROL SULFATE 0.83 MG/ML
2.5 SOLUTION RESPIRATORY (INHALATION) ONCE
Status: DISCONTINUED | OUTPATIENT
Start: 2024-02-23 | End: 2024-02-23 | Stop reason: HOSPADM

## 2024-02-23 RX ORDER — DOCUSATE SODIUM 100 MG/1
100 CAPSULE, LIQUID FILLED ORAL 2 TIMES DAILY
Qty: 10 CAPSULE | Refills: 0 | Status: SHIPPED | OUTPATIENT
Start: 2024-02-23 | End: 2024-02-28

## 2024-02-23 RX ORDER — METOCLOPRAMIDE HYDROCHLORIDE 5 MG/ML
10 INJECTION INTRAMUSCULAR; INTRAVENOUS ONCE
Status: COMPLETED | OUTPATIENT
Start: 2024-02-23 | End: 2024-02-23

## 2024-02-23 RX ORDER — ROCURONIUM BROMIDE 50 MG/5 ML
SYRINGE (ML) INTRAVENOUS AS NEEDED
Status: DISCONTINUED | OUTPATIENT
Start: 2024-02-23 | End: 2024-02-23

## 2024-02-23 RX ORDER — ONDANSETRON HYDROCHLORIDE 2 MG/ML
4 INJECTION, SOLUTION INTRAVENOUS ONCE
Status: COMPLETED | OUTPATIENT
Start: 2024-02-23 | End: 2024-02-23

## 2024-02-23 RX ORDER — ACETAMINOPHEN 325 MG/1
975 TABLET ORAL ONCE
Status: COMPLETED | OUTPATIENT
Start: 2024-02-23 | End: 2024-02-23

## 2024-02-23 RX ORDER — GLYCOPYRROLATE 0.2 MG/ML
INJECTION INTRAMUSCULAR; INTRAVENOUS AS NEEDED
Status: DISCONTINUED | OUTPATIENT
Start: 2024-02-23 | End: 2024-02-23

## 2024-02-23 RX ORDER — CEFAZOLIN SODIUM 2 G/100ML
2 INJECTION, SOLUTION INTRAVENOUS ONCE
Status: COMPLETED | OUTPATIENT
Start: 2024-02-23 | End: 2024-02-23

## 2024-02-23 RX ORDER — KETOROLAC TROMETHAMINE 30 MG/ML
INJECTION, SOLUTION INTRAMUSCULAR; INTRAVENOUS AS NEEDED
Status: DISCONTINUED | OUTPATIENT
Start: 2024-02-23 | End: 2024-02-23

## 2024-02-23 RX ORDER — GABAPENTIN 300 MG/1
600 CAPSULE ORAL ONCE
Status: COMPLETED | OUTPATIENT
Start: 2024-02-23 | End: 2024-02-23

## 2024-02-23 RX ORDER — ADHESIVE BANDAGE
15 BANDAGE TOPICAL DAILY PRN
Qty: 355 ML | Refills: 0 | Status: SHIPPED | OUTPATIENT
Start: 2024-02-23

## 2024-02-23 RX ORDER — KETOROLAC TROMETHAMINE 10 MG/1
10 TABLET, FILM COATED ORAL EVERY 6 HOURS PRN
Qty: 20 TABLET | Refills: 0 | Status: SHIPPED | OUTPATIENT
Start: 2024-02-23 | End: 2024-02-28

## 2024-02-23 RX ORDER — PROPOFOL 10 MG/ML
INJECTION, EMULSION INTRAVENOUS AS NEEDED
Status: DISCONTINUED | OUTPATIENT
Start: 2024-02-23 | End: 2024-02-23

## 2024-02-23 RX ORDER — FENTANYL CITRATE 50 UG/ML
INJECTION, SOLUTION INTRAMUSCULAR; INTRAVENOUS AS NEEDED
Status: DISCONTINUED | OUTPATIENT
Start: 2024-02-23 | End: 2024-02-23

## 2024-02-23 RX ORDER — SODIUM CITRATE AND CITRIC ACID MONOHYDRATE 334; 500 MG/5ML; MG/5ML
30 SOLUTION ORAL ONCE
Status: COMPLETED | OUTPATIENT
Start: 2024-02-23 | End: 2024-02-23

## 2024-02-23 RX ORDER — POLYETHYLENE GLYCOL 3350 17 G/17G
17 POWDER, FOR SOLUTION ORAL DAILY
Qty: 30 PACKET | Refills: 0 | Status: SHIPPED | OUTPATIENT
Start: 2024-02-23 | End: 2024-03-24

## 2024-02-23 RX ORDER — MORPHINE SULFATE 2 MG/ML
2 INJECTION, SOLUTION INTRAMUSCULAR; INTRAVENOUS EVERY 5 MIN PRN
Status: DISCONTINUED | OUTPATIENT
Start: 2024-02-23 | End: 2024-02-23 | Stop reason: HOSPADM

## 2024-02-23 RX ORDER — BUPIVACAINE HYDROCHLORIDE 2.5 MG/ML
INJECTION, SOLUTION INFILTRATION; PERINEURAL AS NEEDED
Status: DISCONTINUED | OUTPATIENT
Start: 2024-02-23 | End: 2024-02-23 | Stop reason: HOSPADM

## 2024-02-23 RX ORDER — CELECOXIB 200 MG/1
400 CAPSULE ORAL ONCE
Status: COMPLETED | OUTPATIENT
Start: 2024-02-23 | End: 2024-02-23

## 2024-02-23 RX ORDER — ONDANSETRON HYDROCHLORIDE 2 MG/ML
4 INJECTION, SOLUTION INTRAVENOUS ONCE AS NEEDED
Status: DISCONTINUED | OUTPATIENT
Start: 2024-02-23 | End: 2024-02-23 | Stop reason: HOSPADM

## 2024-02-23 RX ORDER — MORPHINE SULFATE 4 MG/ML
4 INJECTION INTRAVENOUS EVERY 5 MIN PRN
Status: DISCONTINUED | OUTPATIENT
Start: 2024-02-23 | End: 2024-02-23 | Stop reason: HOSPADM

## 2024-02-23 RX ORDER — SODIUM CHLORIDE, SODIUM LACTATE, POTASSIUM CHLORIDE, CALCIUM CHLORIDE 600; 310; 30; 20 MG/100ML; MG/100ML; MG/100ML; MG/100ML
50 INJECTION, SOLUTION INTRAVENOUS CONTINUOUS
Status: DISCONTINUED | OUTPATIENT
Start: 2024-02-23 | End: 2024-02-23 | Stop reason: HOSPADM

## 2024-02-23 RX ADMIN — ONDANSETRON 4 MG: 2 INJECTION INTRAMUSCULAR; INTRAVENOUS at 11:36

## 2024-02-23 RX ADMIN — SUGAMMADEX 200 MG: 100 INJECTION, SOLUTION INTRAVENOUS at 11:46

## 2024-02-23 RX ADMIN — ACETAMINOPHEN 975 MG: 325 TABLET ORAL at 09:44

## 2024-02-23 RX ADMIN — GLYCOPYRROLATE 0.2 MG: 0.2 INJECTION INTRAMUSCULAR; INTRAVENOUS at 11:28

## 2024-02-23 RX ADMIN — METOCLOPRAMIDE 10 MG: 5 INJECTION, SOLUTION INTRAMUSCULAR; INTRAVENOUS at 09:44

## 2024-02-23 RX ADMIN — PROPOFOL 200 MG: 10 INJECTION, EMULSION INTRAVENOUS at 11:13

## 2024-02-23 RX ADMIN — FENTANYL CITRATE 100 MCG: 50 INJECTION INTRAMUSCULAR; INTRAVENOUS at 11:13

## 2024-02-23 RX ADMIN — FAMOTIDINE 20 MG: 10 INJECTION, SOLUTION INTRAVENOUS at 09:44

## 2024-02-23 RX ADMIN — ONDANSETRON 4 MG: 2 INJECTION INTRAMUSCULAR; INTRAVENOUS at 09:44

## 2024-02-23 RX ADMIN — SODIUM CHLORIDE, POTASSIUM CHLORIDE, SODIUM LACTATE AND CALCIUM CHLORIDE 50 ML/HR: 600; 310; 30; 20 INJECTION, SOLUTION INTRAVENOUS at 09:34

## 2024-02-23 RX ADMIN — LIDOCAINE HYDROCHLORIDE 100 MG: 20 INJECTION INTRAVENOUS at 11:13

## 2024-02-23 RX ADMIN — CELECOXIB 400 MG: 200 CAPSULE ORAL at 09:44

## 2024-02-23 RX ADMIN — Medication 20 MG: at 11:13

## 2024-02-23 RX ADMIN — CEFAZOLIN SODIUM 2 G: 2 INJECTION, SOLUTION INTRAVENOUS at 11:16

## 2024-02-23 RX ADMIN — KETOROLAC TROMETHAMINE 15 MG: 30 INJECTION, SOLUTION INTRAMUSCULAR; INTRAVENOUS at 11:52

## 2024-02-23 RX ADMIN — DEXAMETHASONE SODIUM PHOSPHATE 4 MG: 4 INJECTION, SOLUTION INTRAMUSCULAR; INTRAVENOUS at 11:37

## 2024-02-23 RX ADMIN — SODIUM CITRATE AND CITRIC ACID MONOHYDRATE 30 ML: 500; 334 SOLUTION ORAL at 09:43

## 2024-02-23 RX ADMIN — GABAPENTIN 600 MG: 300 CAPSULE ORAL at 09:44

## 2024-02-23 SDOH — HEALTH STABILITY: MENTAL HEALTH: CURRENT SMOKER: 0

## 2024-02-23 ASSESSMENT — COLUMBIA-SUICIDE SEVERITY RATING SCALE - C-SSRS
1. IN THE PAST MONTH, HAVE YOU WISHED YOU WERE DEAD OR WISHED YOU COULD GO TO SLEEP AND NOT WAKE UP?: NO
2. HAVE YOU ACTUALLY HAD ANY THOUGHTS OF KILLING YOURSELF?: NO
6. HAVE YOU EVER DONE ANYTHING, STARTED TO DO ANYTHING, OR PREPARED TO DO ANYTHING TO END YOUR LIFE?: NO

## 2024-02-23 ASSESSMENT — PAIN - FUNCTIONAL ASSESSMENT
PAIN_FUNCTIONAL_ASSESSMENT: 0-10

## 2024-02-23 ASSESSMENT — PAIN DESCRIPTION - DESCRIPTORS
DESCRIPTORS: DISCOMFORT
DESCRIPTORS: DISCOMFORT

## 2024-02-23 ASSESSMENT — PAIN SCALES - GENERAL
PAINLEVEL_OUTOF10: 4
PAINLEVEL_OUTOF10: 1
PAINLEVEL_OUTOF10: 1
PAIN_LEVEL: 0

## 2024-02-23 NOTE — DISCHARGE INSTRUCTIONS
Call Dr. Jolly for any problems and/or concerns     *Walk as much as possible, it is ok to use stairs carefully  *Ok to shower, avoid soaking in tub baths or swimming for 6 weeks after surgery   *Continue the coughing and deep breathing exercises that your learned in the hospital  *No lifting/straining and avoid constipation for 6 weeks (Avoid strenuous activity)  *Prevent constipation by using stool softeners and staying hydrated, so that you do not strain against your stitches or have pain from constipation  *Vaginal rest for 6 weeks (Do not put anything in your vagina except prescribed vaginal estrogen. Do not use tampons or douches. Do not have sex until cleared by physician)     Call Doctor right away for:     *Fever above 100.4/shaking chills  *Bright red vaginal bleeding or bleeding that soaks more than one sanitary pad per hour  *A foul smelling discharge from the vagina  *Trouble urinating or burning with urination  *Severe pain or bloating in your abdomen  *Persistent nausea/vomiting  *Redness, swelling, or drainage at your incision sites  *Chest pain/shortness of breath-call 911.     - You will be going home with prescriptions for pain medication. If you are able to take them, alternate a dose of Acetaminophen (Tylenol) and Ketorolac (Toradol) every 3 hours. (For example, 1000mg of Tylenol at 09:00am, 10mg Toradol at 12:00pm, 1000mg of Tylenol at 3:00pm, 10mg Toradol at 6:00pm). Use a stool softener, such as Miralax, to prevent constipation from the narcotic medication.

## 2024-02-23 NOTE — ANESTHESIA PROCEDURE NOTES
Airway  Date/Time: 2/23/2024 11:21 AM  Urgency: elective    Airway not difficult    Staffing  Performed: resident   Authorized by: ROB Vela    Performed by: ROB Vela  Patient location during procedure: OR    Indications and Patient Condition  Indications for airway management: anesthesia  Spontaneous Ventilation: absent  Sedation level: deep  Preoxygenated: yes  Patient position: sniffing  Mask difficulty assessment: 1 - vent by mask  Planned trial extubation    Final Airway Details  Final airway type: endotracheal airway      Successful airway: ETT  Cuffed: yes   Successful intubation technique: video laryngoscopy ([place] MAC)  Facilitating devices/methods: intubating stylet  Endotracheal tube insertion site: oral  Blade size: #3  ETT size (mm): 7.0  Cormack-Lehane Classification: grade I - full view of glottis  Placement verified by: chest auscultation and capnometry   Cuff volume (mL): 8  Measured from: lips  ETT to lips (cm): 22  Number of attempts at approach: 2  Ventilation between attempts: BVM  Number of other approaches attempted: 0    Additional Comments  Performed by Elyssa Edmondson Medical Student

## 2024-02-23 NOTE — OP NOTE
Desara sling placement Operative Note     Date: 2024  OR Location: [facility] OR    Name: Sunni Hearn, : 1950, Age: 73 y.o., MRN: [id number], Sex: female    Diagnosis  Pre-op Diagnosis     * LUIS MIGUEL (stress urinary incontinence, female) [N39.3] Post-op Diagnosis     * LUIS MIGUEL (stress urinary incontinence, female) [N39.3]     Procedures  Sling excision   Retropubic sling placement      Surgeons      * Stefan Jolly - Primary    Resident/Fellow/Other Assistant:  Surgeon(s) and Role:    Procedure Summary  Anesthesia: General  ASA: III  Anesthesia Staff: CRNA: OSWALDO Vela-CRNA  Estimated Blood Loss: 10 mL  Intra-op Medications:   Administrations occurring from 1030 to 1230 on 24:   Medication Name Total Dose   BUPivacaine HCl (Marcaine) 0.25 % (2.5 mg/mL) injection 4 mL   lactated Ringer's infusion Cannot be calculated   ceFAZolin in dextrose (iso-os) (Ancef) IVPB 2 g 2 g              Anesthesia Record               Intraprocedure I/O Totals          Intake    lactated Ringer's infusion 1000.00 mL    ceFAZolin in dextrose (iso-os) (Ancef) IVPB 2 g 100.00 mL    Total Intake 1100 mL          Specimen:   ID Type Source Tests Collected by Time   1 : BLADDER MESH Tissue MESH SURGICAL PATHOLOGY EXAM Stefan Jolly MD 2024 1137        Staff:   Circulator: Yamile Bardales RN  Relief Circulator: Aileen Greenfield RN  Scrub Person: Nidhi Welsh; Regina Smith         Drains and/or Catheters: * None in log *    Tourniquet Times:         Implants:     Findings: small erosion noted of original sling, which was noted to be looser than expected    Indications: Sunni Hearn is an 73 y.o. female who is having surgery for LUIS MIGUEL (stress urinary incontinence, female) [N39.3].     The patient was seen in the preoperative area. The risks, benefits, complications, treatment options, non-operative alternatives, expected recovery and outcomes were discussed with the patient. The possibilities of reaction to  medication, pulmonary aspiration, injury to surrounding structures, bleeding, recurrent infection, the need for additional procedures, failure to diagnose a condition, and creating a complication requiring transfusion or operation were discussed with the patient. The patient concurred with the proposed plan, giving informed consent.  The site of surgery was properly noted/marked if necessary per policy. The patient has been actively warmed in preoperative area. Preoperative antibiotics have been ordered and given within 1 hours of incision. Venous thrombosis prophylaxis have been ordered including bilateral sequential compression devices    Procedure Details: Patient was taken to the operating room where she was prepped and draped in usual sterile fashion and was then placed in dorsal lithotomy.  A Mai catheter was inserted in the bladder a Phoenix retractor was placed in the posterior vagina the anterior vaginal wall was grasped with 2 Allis clamps 1 cm below the urethral meatus after  infiltrating the area with 1% lidocaine with epinephrine a 1 cm vertical incision was made in the anterior vaginal wall the periurethral tissue was dissected off of the epithelium using Metzenbaum scissors.  The mesh was easily identified and dissected using tenotomy scissors off of the periurethral tissue.  The mesh was lifted off of the urethra using a right angle clamp and then cut with Vyas scissors.  The mesh was removed to the level of the retropubic tunnels. Two sites were then identified with each site 2.5 cm. from the midline at the level of the symphysis pubis. . Sharp dissection was carried out bilaterally using Metzenbaum scissors to further develop the periurethral tunnels  bilaterally.  A Mai catheter with a catheter guide was inserted into the bladder.   The TVT trocars were passed vaginally and retropubically with bladder deviation to the opposite side until it appeared suprapubically through a stab incision. The  catheter was removed.  A cystoscopy was performed, which was entirely normal.  The bladder was drained.   The trocars were brought up entirely suprapubically and excised.  The end of the mesh was held in place with a Ruth Ann clamp.   The end of the mesh was held in place with a Ruth Ann clamp. Tension was adjusted on the mesh by drawing up on the suprapubic ends with a #8 Hegar dilator maintained between the tape and the urethra. The plastic sheaths were removed bilaterally.  The excess mesh was excised suprapubically.  The suprapubic sites were closed with interrupted stitches of 4-0 Vicryl suture.  The vaginal incision was closed with a running stitch of 0 Vicryl suture.      Complications:  None; patient tolerated the procedure well.    Disposition: PACU - hemodynamically stable.  Condition: stable         Additional Details:     Attending Attestation: I was present and scrubbed for the entire procedure.    Stefan Jolly  Phone Number: [phone]

## 2024-02-23 NOTE — ANESTHESIA POSTPROCEDURE EVALUATION
Patient: Sunni Hearn    Procedure Summary       Date: 02/23/24 Room / Location: [facility] OR 06 / Virtual [facility] OR    Anesthesia Start: 1108 Anesthesia Stop: 1202    Procedure: Desara sling placement Diagnosis:       LUIS MIGUEL (stress urinary incontinence, female)      (LUIS MIGUEL (stress urinary incontinence, female) [N39.3])    Surgeons: Stefan Jolly MD Responsible Provider: ROB Vela    Anesthesia Type: general ASA Status: 3            Anesthesia Type: general    Vitals Value Taken Time   /82 02/23/24 1250   Temp 36.4 °C (97.6 °F) 02/23/24 1250   Pulse 63 02/23/24 1250   Resp 18 02/23/24 1250   SpO2 97 % 02/23/24 1250       Anesthesia Post Evaluation    Patient location during evaluation: PACU  Patient participation: complete - patient participated  Level of consciousness: awake and alert  Pain score: 0  Pain management: adequate  Airway patency: patent  Cardiovascular status: acceptable  Respiratory status: acceptable  Hydration status: acceptable  Postoperative Nausea and Vomiting: none    No notable events documented.

## 2024-02-23 NOTE — ANESTHESIA PREPROCEDURE EVALUATION
Patient: Sunni Hearn    Procedure Information       Date/Time: 02/23/24 1030    Procedure: Desara sling placement - whole procedure will take 1.5 hours    Location: [facility] OR 06 / Virtual [facility]    Surgeons: Stefan Jolly MD            Relevant Problems   Anesthesia (within normal limits)      Cardiovascular   (+) Benign essential hypertension   (+) Essential (primary) hypertension   (+) Hyperlipidemia      Endocrine   (+) Goiter, nodular      GI   (+) GERD without esophagitis      Neuro/Psych   (+) Anxiety   (+) Depression with anxiety   (+) Low back pain with sciatica   (+) Sciatica      Hematology   (+) Anemia   (+) Anemia due to chemotherapy   (+) Diffuse large B-cell lymphoma of lymph nodes of multiple regions (CMS/HCC)   (+) Non Hodgkin's lymphoma (CMS/HCC)      Musculoskeletal   (+) Lumbar spinal stenosis   (+) OA (osteoarthritis) of knee   (+) Primary osteoarthritis of right knee      Eyes, Ears, Nose, and Throat   (+) Primary open angle glaucoma (POAG) of both eyes, mild stage   (+) Primary open-angle glaucoma, right eye, mild stage      Infectious Disease   (+) Acute candidiasis of vulva and vagina   (+) COVID-19      Other   (+) Hand arthritis       Clinical information reviewed:   Tobacco  Allergies  Meds  Problems  Med Hx  Surg Hx   Fam Hx          NPO Detail:  NPO/Void Status  Date of Last Liquid: 02/23/24  Time of Last Liquid: 0700 (sip with meds this am)  Date of Last Solid: 02/22/24  Time of Last Solid: 1800         Physical Exam    Airway  Mallampati: III  TM distance: >3 FB  Neck ROM: full     Cardiovascular - normal exam     Dental - normal exam     Pulmonary - normal exam     Abdominal            Anesthesia Plan    History of general anesthesia?: yes  History of complications of general anesthesia?: no    ASA 3     general     The patient is not a current smoker.  Patient did not smoke on day of procedure.    intravenous induction   Anesthetic plan and risks discussed with patient.    Plan  discussed with CRNA.

## 2024-02-23 NOTE — H&P
History Of Present Illness  Sunni Hearn is a 73 y.o. female presenting with em for sling .     Past Medical History  Past Medical History:   Diagnosis Date    Acute candidiasis of vulva and vagina     Vaginal yeast infection    Acute vaginitis 04/21/2016    Acute vaginitis    Anemia     Anxiety     COVID-19 12/01/2020    COVID-19    Dizziness     GERD (gastroesophageal reflux disease)     Hyperlipidemia     Hypertension     Localized swelling, mass and lump, left lower limb 02/15/2019    Ankle mass, left    Non Hodgkin's lymphoma (CMS/HCC)     Orthostatic lightheadedness 11/17/2023    Other bursal cyst, unspecified site 03/07/2019    Synovial cyst    Other conditions influencing health status 06/06/2013    Nontoxic Goiter    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    Pain in right knee 12/23/2015    Knee pain, right    Personal history of diseases of the blood and blood-forming organs and certain disorders involving the immune mechanism 12/10/2015    History of anemia    Personal history of other diseases of the circulatory system 04/07/2016    History of sinus tachycardia    Personal history of other diseases of the musculoskeletal system and connective tissue 06/20/2013    History of tendinitis    Personal history of other diseases of the respiratory system 11/18/2020    History of acute sinusitis    Personal history of other mental and behavioral disorders 01/14/2021    History of anxiety disorder    Personal history of other specified conditions 04/07/2016    History of bradycardia    Personal history of other specified conditions 12/01/2020    History of fatigue    Personal history of other specified conditions 12/10/2015    History of dysuria    Personal history of urinary (tract) infections 08/30/2018    History of urinary tract infection    Preglaucoma, unspecified, bilateral 09/02/2015    Glaucoma suspect, both eyes    Presence of spectacles and contact lenses  2015    Uses contact lenses    Primary open-angle glaucoma, bilateral, mild stage 10/12/2015    Primary open angle glaucoma of both eyes, mild stage    Primary open-angle glaucoma, bilateral, mild stage 10/12/2015    Primary open angle glaucoma of both eyes, mild stage    Primary open-angle glaucoma, bilateral, mild stage 10/12/2015    Primary open angle glaucoma of both eyes, mild stage    Primary open-angle glaucoma, bilateral, mild stage 10/13/2015    Primary open angle glaucoma of both eyes, mild stage    Primary open-angle glaucoma, unspecified eye, stage unspecified 10/13/2015    POAG (primary open-angle glaucoma)    Right lower quadrant pain 2015    Abdominal pain, right lower quadrant       Surgical History  Past Surgical History:   Procedure Laterality Date     SECTION, CLASSIC  2013     Section    CT GUIDED PERCUTANEOUS BIOPSY BONE DEEP  2023    CT GUIDED PERCUTANEOUS BIOPSY BONE DEEP 2023 DOCTOR OFFICE LEGACY    CT GUIDED PERCUTANEOUS BIOPSY BONE DEEP  2/10/2023    CT GUIDED PERCUTANEOUS BIOPSY BONE DEEP 2/10/2023 DOCTOR OFFICE LEGACY    OTHER SURGICAL HISTORY  2021    Knee replacement        Social History  She reports that she has never smoked. She has never used smokeless tobacco. No history on file for alcohol use and drug use.    Family History  No family history on file.     Allergies  Codeine, Hydrochlorothiazide, Methylprednisolone, and Adhesive tape-silicones    Review of Systems   Genitourinary: Negative.    All other systems reviewed and are negative.       Physical Exam  HENT:      Head: Normocephalic.      Nose: Nose normal.      Mouth/Throat:      Mouth: Mucous membranes are moist.   Cardiovascular:      Rate and Rhythm: Normal rate and regular rhythm.   Pulmonary:      Effort: Pulmonary effort is normal.   Abdominal:      General: Abdomen is flat. Bowel sounds are normal.   Musculoskeletal:         General: Normal range of motion.       "Cervical back: Normal range of motion.   Skin:     General: Skin is warm.   Neurological:      Mental Status: She is alert.          Last Recorded Vitals  Blood pressure (!) 182/84, pulse 59, temperature 37.3 °C (99.2 °F), temperature source Temporal, resp. rate 17, height 1.702 m (5' 7\"), weight 81.6 kg (180 lb), SpO2 96 %.    Relevant Results             Assessment/Plan   Principal Problem:    LUIS MIGUEL (stress urinary incontinence, female)  Active Problems:    Acute candidiasis of vulva and vagina    Acute vaginitis    Anemia    Anxiety    COVID-19    Localized swelling, mass and lump, left lower limb    Non Hodgkin's lymphoma (CMS/HCC)    Other bursal cyst, unspecified site    Other neuromuscular dysfunction of bladder    Pain in right knee    Personal history of diseases of the blood and blood-forming organs and certain disorders involving the immune mechanism    Personal history of other diseases of the circulatory system    Personal history of other diseases of the musculoskeletal system and connective tissue    Personal history of other diseases of the respiratory system    Personal history of other mental and behavioral disorders    Personal history of other specified conditions    Personal history of urinary (tract) infections    Preglaucoma, unspecified, bilateral    Presence of spectacles and contact lenses      Proceed with sling           Stefan Jolly MD    "

## 2024-02-24 LAB
ATRIAL RATE: 58 BPM
P AXIS: 66 DEGREES
PR INTERVAL: 160 MS
Q ONSET: 252 MS
QRS COUNT: 9 BEATS
QRS DURATION: 104 MS
QT INTERVAL: 456 MS
QTC CALCULATION(BAZETT): 448 MS
QTC FREDERICIA: 450 MS
R AXIS: -6 DEGREES
T AXIS: 10 DEGREES
T OFFSET: 480 MS
VENTRICULAR RATE: 58 BPM

## 2024-03-01 ENCOUNTER — TELEPHONE (OUTPATIENT)
Dept: ADMISSION | Facility: HOSPITAL | Age: 74
End: 2024-03-01
Payer: COMMERCIAL

## 2024-03-01 NOTE — TELEPHONE ENCOUNTER
I called [employer] at the number provided by representative and couldn't reach anyone from [employer]. If [employer] calls back please give them the Account/Billing number to call at [phone] to get the codes they need for a wig made on [employer] 7/6/2023. Thanks.

## 2024-03-01 NOTE — TELEPHONE ENCOUNTER
Loreta from [employer] called about needing codes for a 7/6/2023 wig made for patient form [facility].

## 2024-03-04 LAB
LABORATORY COMMENT REPORT: NORMAL
PATH REPORT.FINAL DX SPEC: NORMAL
PATH REPORT.GROSS SPEC: NORMAL
PATH REPORT.RELEVANT HX SPEC: NORMAL
PATH REPORT.TOTAL CANCER: NORMAL

## 2024-03-13 ENCOUNTER — TELEMEDICINE (OUTPATIENT)
Dept: UROLOGY | Facility: CLINIC | Age: 74
End: 2024-03-13
Payer: COMMERCIAL

## 2024-03-13 DIAGNOSIS — K21.9 GERD WITHOUT ESOPHAGITIS: ICD-10-CM

## 2024-03-13 DIAGNOSIS — N39.41 URGE INCONTINENCE OF URINE: ICD-10-CM

## 2024-03-13 DIAGNOSIS — R30.0 DYSURIA: Primary | ICD-10-CM

## 2024-03-13 DIAGNOSIS — N39.3 SUI (STRESS URINARY INCONTINENCE, FEMALE): ICD-10-CM

## 2024-03-13 PROCEDURE — 1036F TOBACCO NON-USER: CPT

## 2024-03-13 PROCEDURE — 99024 POSTOP FOLLOW-UP VISIT: CPT

## 2024-03-13 PROCEDURE — 1159F MED LIST DOCD IN RCRD: CPT

## 2024-03-13 PROCEDURE — 1160F RVW MEDS BY RX/DR IN RCRD: CPT

## 2024-03-13 PROCEDURE — 1126F AMNT PAIN NOTED NONE PRSNT: CPT

## 2024-03-13 RX ORDER — OMEPRAZOLE 20 MG/1
20 CAPSULE, DELAYED RELEASE ORAL DAILY
Qty: 90 CAPSULE | Refills: 2 | Status: SHIPPED | OUTPATIENT
Start: 2024-03-13

## 2024-03-13 NOTE — PROGRESS NOTES
[facility]  Outpatient Clinic Note    Patient: Sunni Hearn  Age/Sex: 73 y.o., female  MRN: [id number]  Virtual Visit: An interactive audio and video telecommunication system which permits real time communications between the patient (at the originating site) and provider (at the distant site) was utilized to provide this telehealth service. Verbal consent was requested and obtained from Sunni Hearn on this date 3/13/2024 for a telehealth visit.     Chief Complaint:  2 week post op virtual visit         History of Present Illness  This is a 73 y.o. female, who presents to the office for her 2 week post op visit from a sling procedure with Dr. Jolly on 02/23/2024. She reports she has been doing well since surgery. She is eating and drinking, as normal. Urine has remained clear, yellow. She denies LUIS MIGUEL, but admits to still having UUI throughout the day. She stated when she urinates she does have a burning sensation.  Bowels have returned to normal. No drainage from incision site. She is ambulating at baseline. No gross hematuria, fevers or chills.              Past Medical & Surgical History  Past Medical History:   Diagnosis Date    Acute candidiasis of vulva and vagina     Vaginal yeast infection    Acute vaginitis 04/21/2016    Acute vaginitis    Anemia     Anxiety     COVID-19 12/01/2020    COVID-19    Dizziness     GERD (gastroesophageal reflux disease)     Hyperlipidemia     Hypertension     Localized swelling, mass and lump, left lower limb 02/15/2019    Ankle mass, left    Non Hodgkin's lymphoma (CMS/HCC)     Orthostatic lightheadedness 11/17/2023    Other bursal cyst, unspecified site 03/07/2019    Synovial cyst    Other conditions influencing health status 06/06/2013    Nontoxic Goiter    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    Pain in right knee 12/23/2015    Knee pain, right    Personal history of diseases of the blood and blood-forming organs and  certain disorders involving the immune mechanism 12/10/2015    History of anemia    Personal history of other diseases of the circulatory system 2016    History of sinus tachycardia    Personal history of other diseases of the musculoskeletal system and connective tissue 2013    History of tendinitis    Personal history of other diseases of the respiratory system 2020    History of acute sinusitis    Personal history of other mental and behavioral disorders 2021    History of anxiety disorder    Personal history of other specified conditions 2016    History of bradycardia    Personal history of other specified conditions 2020    History of fatigue    Personal history of other specified conditions 12/10/2015    History of dysuria    Personal history of urinary (tract) infections 2018    History of urinary tract infection    Preglaucoma, unspecified, bilateral 2015    Glaucoma suspect, both eyes    Presence of spectacles and contact lenses 2015    Uses contact lenses    Primary open-angle glaucoma, bilateral, mild stage 10/12/2015    Primary open angle glaucoma of both eyes, mild stage    Primary open-angle glaucoma, bilateral, mild stage 10/12/2015    Primary open angle glaucoma of both eyes, mild stage    Primary open-angle glaucoma, bilateral, mild stage 10/12/2015    Primary open angle glaucoma of both eyes, mild stage    Primary open-angle glaucoma, bilateral, mild stage 10/13/2015    Primary open angle glaucoma of both eyes, mild stage    Primary open-angle glaucoma, unspecified eye, stage unspecified 10/13/2015    POAG (primary open-angle glaucoma)    Right lower quadrant pain 2015    Abdominal pain, right lower quadrant     Past Surgical History:   Procedure Laterality Date     SECTION, CLASSIC  2013     Section    CT GUIDED PERCUTANEOUS BIOPSY BONE DEEP  2023    CT GUIDED PERCUTANEOUS BIOPSY BONE DEEP 2023 DOCTOR  OFFICE LEGACY    CT GUIDED PERCUTANEOUS BIOPSY BONE DEEP  2/10/2023    CT GUIDED PERCUTANEOUS BIOPSY BONE DEEP 2/10/2023 DOCTOR OFFICE LEGACY    OTHER SURGICAL HISTORY  03/04/2021    Knee replacement       Family History  No family history on file.    Social History  She reports that she has never smoked. She has never used smokeless tobacco. No history on file for alcohol use and drug use.    Allergies  Codeine, Hydrochlorothiazide, Methylprednisolone, and Adhesive tape-silicones    Medications:  Current Outpatient Medications on File Prior to Visit   Medication Sig Dispense Refill    amLODIPine (Norvasc) 5 mg tablet Take 1 tablet (5 mg) by mouth 2 times a day. Take one tablet every 12 hours      brimonidine (AlphaGAN) 0.2 % ophthalmic solution Administer 1 drop into both eyes 2 times a day. (Patient not taking: Reported on 1/25/2024) 20 mL 2    brimonidine (AlphaGAN) 0.2 % ophthalmic solution Administer 1 drop into both eyes 2 times a day. 15 mL 11    cholecalciferol (Vitamin D-3) 25 MCG (1000 UT) capsule Take by mouth.      cranberry conc-ascorbic acid 6,000-100 mg capsule Take by mouth.      cyanocobalamin/folic acid (vitamin B12-folic acid) 500-400 mcg tablet Take by mouth.      escitalopram (Lexapro) 10 mg tablet Take 1 tablet (10 mg) by mouth once daily in the evening.      ibuprofen 800 mg tablet Take 1 tablet (800 mg) by mouth every 8 hours if needed for mild pain (1 - 3).      lisinopril 40 mg tablet 1/2 tab q day 90 tablet 1    magnesium hydroxide (Milk of Magnesia) 400 mg/5 mL suspension Take 15 mL by mouth once daily as needed for constipation (if have not had a BM by postoperative day 3). 355 mL 0    metoprolol succinate XL (Toprol-XL) 50 mg 24 hr tablet TAKE 1/2 TABLET (25MG) BY MOUTH EVERY 12 HOURS      polyethylene glycol (Glycolax, Miralax) 17 gram packet Take 17 g dissolve in liquid as directed by mouth once daily. 30 packet 0    tretinoin (Retin-A) 0.025 % cream Apply topically once daily at  bedtime.       No current facility-administered medications on file prior to visit.        Review of Systems   A comprehensive 10+ review of systems was negative except for: see hpi          Physical Exam                                                                                                                      General: Well developed, well nourished, alert and cooperative, appears in no acute distress  Eyes: no proptosis  Lungs: Breathing is easy, non-labored while speaking in clear and complete sentences.   Neuro: alert and oriented to person, place and time  Psych: Demonstrates good judgement and reason, without hallucinations, abnormal affect or abnormal behaviors.  Skin: no obvious lesions, no rashes      Labs  N/A    Imaging  N/A    IMPRESSION AND PLAN:  Sunni Hearn is a 73 y.o. with mixed incontinence, positive CST     CARISA  -failed botox 100 units, detrol, myrbetriq (STM)  -UDS shows em, normal emptying   -No improvement with repeat trial of myrbetriq   -S/p sling 8/23, still having issues with UI, no sensation, worse at night  -Repeat UDS, shows EM   -Doing well from sling procedure with Dr. Jolly on 02/23/2024.  -Will discuss UUI at her 6 week appointment    Dysuria  -Patient will drop off a urine sample to a  lab, I will call patient with results.     6 week post op appointment with Dr. Jolly on 4/25    All questions and concerns were answered and addressed.  The patient expressed understanding and agrees with the plan.     Reviewed and approved by LISET LARIOS on 3/13/24 at 7:21 AM.

## 2024-03-14 ENCOUNTER — LAB (OUTPATIENT)
Dept: LAB | Facility: LAB | Age: 74
End: 2024-03-14
Payer: COMMERCIAL

## 2024-03-14 DIAGNOSIS — R30.0 DYSURIA: ICD-10-CM

## 2024-03-14 PROCEDURE — 87086 URINE CULTURE/COLONY COUNT: CPT

## 2024-03-14 PROCEDURE — 87186 SC STD MICRODIL/AGAR DIL: CPT

## 2024-03-16 DIAGNOSIS — N39.0 URINARY TRACT INFECTION WITHOUT HEMATURIA, SITE UNSPECIFIED: Primary | ICD-10-CM

## 2024-03-16 LAB — BACTERIA UR CULT: ABNORMAL

## 2024-03-16 RX ORDER — CIPROFLOXACIN 250 MG/1
250 TABLET, FILM COATED ORAL 2 TIMES DAILY
Qty: 6 TABLET | Refills: 0 | Status: SHIPPED | OUTPATIENT
Start: 2024-03-16 | End: 2024-03-19

## 2024-03-23 DIAGNOSIS — I10 ESSENTIAL (PRIMARY) HYPERTENSION: ICD-10-CM

## 2024-03-25 RX ORDER — AMLODIPINE BESYLATE 10 MG/1
TABLET ORAL EVERY 12 HOURS
Qty: 90 TABLET | Refills: 1 | Status: SHIPPED | OUTPATIENT
Start: 2024-03-25

## 2024-03-26 ENCOUNTER — TELEPHONE (OUTPATIENT)
Dept: UROLOGY | Facility: CLINIC | Age: 74
End: 2024-03-26
Payer: COMMERCIAL

## 2024-03-26 NOTE — TELEPHONE ENCOUNTER
Spoke to patient via phone to drop off a urine specimen per Selin.  Pt agreed and will drop off specimen at earliest convenience.  She denies fever, nausea, chills.  Told her that if this occurs she should go to the Emergency Department and she expressed understanding. Will call back with any further concerns.

## 2024-03-28 ENCOUNTER — LAB (OUTPATIENT)
Dept: LAB | Facility: LAB | Age: 74
End: 2024-03-28
Payer: COMMERCIAL

## 2024-03-28 DIAGNOSIS — R30.0 DYSURIA: ICD-10-CM

## 2024-03-28 PROCEDURE — 87086 URINE CULTURE/COLONY COUNT: CPT

## 2024-03-29 LAB — BACTERIA UR CULT: NORMAL

## 2024-04-25 ENCOUNTER — OFFICE VISIT (OUTPATIENT)
Dept: UROLOGY | Facility: CLINIC | Age: 74
End: 2024-04-25
Payer: COMMERCIAL

## 2024-04-25 DIAGNOSIS — N32.81 OAB (OVERACTIVE BLADDER): Primary | ICD-10-CM

## 2024-04-25 DIAGNOSIS — N39.3 SUI (STRESS URINARY INCONTINENCE, FEMALE): ICD-10-CM

## 2024-04-25 DIAGNOSIS — G62.9 NEUROPATHY: ICD-10-CM

## 2024-04-25 PROCEDURE — 99214 OFFICE O/P EST MOD 30 MIN: CPT | Performed by: STUDENT IN AN ORGANIZED HEALTH CARE EDUCATION/TRAINING PROGRAM

## 2024-04-25 PROCEDURE — 1159F MED LIST DOCD IN RCRD: CPT | Performed by: STUDENT IN AN ORGANIZED HEALTH CARE EDUCATION/TRAINING PROGRAM

## 2024-04-25 PROCEDURE — 1160F RVW MEDS BY RX/DR IN RCRD: CPT | Performed by: STUDENT IN AN ORGANIZED HEALTH CARE EDUCATION/TRAINING PROGRAM

## 2024-04-25 RX ORDER — VIBEGRON 75 MG/1
75 TABLET, FILM COATED ORAL DAILY
Qty: 84 TABLET | Refills: 0 | COMMUNITY
Start: 2024-04-25 | End: 2024-07-18

## 2024-04-25 RX ORDER — PREGABALIN 75 MG/1
75 CAPSULE ORAL DAILY
Qty: 90 CAPSULE | Refills: 3 | Status: SHIPPED | OUTPATIENT
Start: 2024-04-25 | End: 2025-04-25

## 2024-04-25 NOTE — PROGRESS NOTES
HISTORY OF PRESENT ILLNESS:  Sunni Hearn is a 73 y.o. female who presents today status post sling on 2/23/24. She states she is happy with the improvement  With regard to her stress incontinence.  However she continues to have urgency especially in the morning when she first wakes up.  She also has this throughout the day.  She has no leakage with stress or urgency incontinence. She states she does have urgency in general for the need to urinate.          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), Pain in right knee (12/23/2015), Personal history of diseases of the blood and blood-forming organs and certain disorders involving the immune mechanism (12/10/2015), Personal history of other diseases of the circulatory system (04/07/2016), Personal history of other diseases of the musculoskeletal system and connective tissue (06/20/2013), Personal history of other diseases of the respiratory system (11/18/2020), Personal history of other mental and behavioral disorders (01/14/2021), Personal history of other specified conditions (04/07/2016), Personal history of other specified conditions (12/01/2020), Personal history of other specified conditions (12/10/2015), Personal history of urinary (tract) infections (08/30/2018), Preglaucoma, unspecified, bilateral (09/02/2015), Presence of spectacles and contact lenses (06/25/2015), Primary open-angle glaucoma, bilateral, mild stage (10/12/2015), Primary open-angle glaucoma,  "bilateral, mild stage (10/12/2015), Primary open-angle glaucoma, bilateral, mild stage (10/12/2015), Primary open-angle glaucoma, bilateral, mild stage (10/13/2015), Primary open-angle glaucoma, unspecified eye, stage unspecified (10/13/2015), and Right lower quadrant pain (2015).    Surgical History  She has a past surgical history that includes  section, classic (2013); Other surgical history (2021); CT guided percutaneous biopsy bone deep (2023); and CT guided percutaneous biopsy bone deep (2/10/2023).     Social History  She reports that she has never smoked. She has never used smokeless tobacco. No history on file for alcohol use and drug use.    Family History  No family history on file.     Allergies  Codeine, Hydrochlorothiazide, Methylprednisolone, and Adhesive tape-silicones      A comprehensive 10+ review of systems was negative except for: see hpi                          PHYSICAL EXAMINATION:  BP Readings from Last 3 Encounters:   24 167/87   24 154/77   24 130/72      Wt Readings from Last 3 Encounters:   24 81.6 kg (180 lb)   24 81.9 kg (180 lb 8.9 oz)   24 82 kg (180 lb 12.4 oz)      BMI: Estimated body mass index is 28.19 kg/m² as calculated from the following:    Height as of 24: 1.702 m (5' 7\").    Weight as of 24: 81.6 kg (180 lb).  BSA: Estimated body surface area is 1.96 meters squared as calculated from the following:    Height as of 24: 1.702 m (5' 7\").    Weight as of 24: 81.6 kg (180 lb).  HEENT: Normocephalic, atraumatic, PER EOMI, nonicteric, trachea normal, thyroid normal, oropharynx normal.  CARDIAC: regular rate & rhythm, S1 & S2 normal.  No heaves, thrills, gallops or murmurs.  LUNGS: Clear to auscultation, no spinal or CV tenderness.  EXTREMITIES: No evidence of cyanosis, clubbing or edema.               Assessment:  Sunni KEVIN Nadiya is a 73 y.o. with mixed incontinence, positive CST     CARISA  -failed " botox 100 units, detrol, myrbetriq (STM)  -UDS shows em, normal emptying   -S/p sling 8/23, did not have complete resolution of her stress symptoms, Repeat UDS, show showed persistent stress incontinence,  -S/P sling 02/23/2024, doing well but has persistent urgency    Given gemtesa and bladder training exercises    We also discussed neuromodulation    Neuropathy:  Prescribed Lyrica  I will also refer her to integrative oncology    All questions and concerns were answered and addressed.  The patient expressed understanding and agrees with the plan.     Stefan Jolly MD    Scribe Attestation  By signing my name below, I, Lily Merritt, Scribe   attest that this documentation has been prepared under the direction and in the presence of Stefan Jolly MD.

## 2024-05-02 ENCOUNTER — OFFICE VISIT (OUTPATIENT)
Dept: HEMATOLOGY/ONCOLOGY | Facility: HOSPITAL | Age: 74
End: 2024-05-02
Payer: COMMERCIAL

## 2024-05-02 ENCOUNTER — LAB (OUTPATIENT)
Dept: LAB | Facility: HOSPITAL | Age: 74
End: 2024-05-02
Payer: COMMERCIAL

## 2024-05-02 VITALS
DIASTOLIC BLOOD PRESSURE: 73 MMHG | HEART RATE: 66 BPM | TEMPERATURE: 96.8 F | SYSTOLIC BLOOD PRESSURE: 146 MMHG | OXYGEN SATURATION: 99 % | WEIGHT: 181.2 LBS | RESPIRATION RATE: 16 BRPM | BODY MASS INDEX: 28.38 KG/M2

## 2024-05-02 DIAGNOSIS — C83.38 DIFFUSE LARGE B-CELL LYMPHOMA OF LYMPH NODES OF MULTIPLE REGIONS (MULTI): ICD-10-CM

## 2024-05-02 LAB
ALBUMIN SERPL BCP-MCNC: 4.1 G/DL (ref 3.4–5)
ALP SERPL-CCNC: 58 U/L (ref 33–136)
ALT SERPL W P-5'-P-CCNC: 14 U/L (ref 7–45)
ANION GAP SERPL CALC-SCNC: 12 MMOL/L (ref 10–20)
AST SERPL W P-5'-P-CCNC: 15 U/L (ref 9–39)
BASOPHILS # BLD AUTO: 0.02 X10*3/UL (ref 0–0.1)
BASOPHILS NFR BLD AUTO: 0.4 %
BILIRUB SERPL-MCNC: 0.4 MG/DL (ref 0–1.2)
BUN SERPL-MCNC: 16 MG/DL (ref 6–23)
CALCIUM SERPL-MCNC: 9.3 MG/DL (ref 8.6–10.3)
CHLORIDE SERPL-SCNC: 108 MMOL/L (ref 98–107)
CO2 SERPL-SCNC: 27 MMOL/L (ref 21–32)
CREAT SERPL-MCNC: 0.63 MG/DL (ref 0.5–1.05)
EGFRCR SERPLBLD CKD-EPI 2021: >90 ML/MIN/1.73M*2
EOSINOPHIL # BLD AUTO: 0.13 X10*3/UL (ref 0–0.4)
EOSINOPHIL NFR BLD AUTO: 2.3 %
ERYTHROCYTE [DISTWIDTH] IN BLOOD BY AUTOMATED COUNT: 13.8 % (ref 11.5–14.5)
GLUCOSE SERPL-MCNC: 94 MG/DL (ref 74–99)
HCT VFR BLD AUTO: 35.9 % (ref 36–46)
HGB BLD-MCNC: 11.5 G/DL (ref 12–16)
IMM GRANULOCYTES # BLD AUTO: 0.03 X10*3/UL (ref 0–0.5)
IMM GRANULOCYTES NFR BLD AUTO: 0.5 % (ref 0–0.9)
LDH SERPL L TO P-CCNC: 144 U/L (ref 84–246)
LYMPHOCYTES # BLD AUTO: 1.25 X10*3/UL (ref 0.8–3)
LYMPHOCYTES NFR BLD AUTO: 22.2 %
MCH RBC QN AUTO: 28.4 PG (ref 26–34)
MCHC RBC AUTO-ENTMCNC: 32 G/DL (ref 32–36)
MCV RBC AUTO: 89 FL (ref 80–100)
MONOCYTES # BLD AUTO: 0.65 X10*3/UL (ref 0.05–0.8)
MONOCYTES NFR BLD AUTO: 11.5 %
NEUTROPHILS # BLD AUTO: 3.55 X10*3/UL (ref 1.6–5.5)
NEUTROPHILS NFR BLD AUTO: 63.1 %
NRBC BLD-RTO: 0 /100 WBCS (ref 0–0)
PLATELET # BLD AUTO: 186 X10*3/UL (ref 150–450)
POTASSIUM SERPL-SCNC: 4.2 MMOL/L (ref 3.5–5.3)
PROT SERPL-MCNC: 6.2 G/DL (ref 6.4–8.2)
RBC # BLD AUTO: 4.05 X10*6/UL (ref 4–5.2)
SODIUM SERPL-SCNC: 143 MMOL/L (ref 136–145)
WBC # BLD AUTO: 5.6 X10*3/UL (ref 4.4–11.3)

## 2024-05-02 PROCEDURE — 3077F SYST BP >= 140 MM HG: CPT | Performed by: INTERNAL MEDICINE

## 2024-05-02 PROCEDURE — 85025 COMPLETE CBC W/AUTO DIFF WBC: CPT

## 2024-05-02 PROCEDURE — 99214 OFFICE O/P EST MOD 30 MIN: CPT | Performed by: INTERNAL MEDICINE

## 2024-05-02 PROCEDURE — 3078F DIAST BP <80 MM HG: CPT | Performed by: INTERNAL MEDICINE

## 2024-05-02 PROCEDURE — 1160F RVW MEDS BY RX/DR IN RCRD: CPT | Performed by: INTERNAL MEDICINE

## 2024-05-02 PROCEDURE — 1126F AMNT PAIN NOTED NONE PRSNT: CPT | Performed by: INTERNAL MEDICINE

## 2024-05-02 PROCEDURE — 36415 COLL VENOUS BLD VENIPUNCTURE: CPT

## 2024-05-02 PROCEDURE — 1159F MED LIST DOCD IN RCRD: CPT | Performed by: INTERNAL MEDICINE

## 2024-05-02 PROCEDURE — 84075 ASSAY ALKALINE PHOSPHATASE: CPT

## 2024-05-02 PROCEDURE — 83615 LACTATE (LD) (LDH) ENZYME: CPT

## 2024-05-02 ASSESSMENT — PAIN SCALES - GENERAL: PAINLEVEL_OUTOF10: 0-NO PAIN

## 2024-05-03 ENCOUNTER — OFFICE VISIT (OUTPATIENT)
Dept: OPHTHALMOLOGY | Facility: CLINIC | Age: 74
End: 2024-05-03
Payer: COMMERCIAL

## 2024-05-03 DIAGNOSIS — H52.203 MYOPIA OF BOTH EYES WITH ASTIGMATISM AND PRESBYOPIA: ICD-10-CM

## 2024-05-03 DIAGNOSIS — H40.1131 PRIMARY OPEN ANGLE GLAUCOMA (POAG) OF BOTH EYES, MILD STAGE: Primary | ICD-10-CM

## 2024-05-03 DIAGNOSIS — H52.4 MYOPIA OF BOTH EYES WITH ASTIGMATISM AND PRESBYOPIA: ICD-10-CM

## 2024-05-03 DIAGNOSIS — H52.13 MYOPIA OF BOTH EYES WITH ASTIGMATISM AND PRESBYOPIA: ICD-10-CM

## 2024-05-03 DIAGNOSIS — H25.043 POSTERIOR SUBCAPSULAR POLAR AGE-RELATED CATARACT, BILATERAL: ICD-10-CM

## 2024-05-03 PROCEDURE — 92133 CPTRZD OPH DX IMG PST SGM ON: CPT | Performed by: OPTOMETRIST

## 2024-05-03 PROCEDURE — 92015 DETERMINE REFRACTIVE STATE: CPT | Performed by: OPTOMETRIST

## 2024-05-03 PROCEDURE — 92012 INTRM OPH EXAM EST PATIENT: CPT | Performed by: OPTOMETRIST

## 2024-05-03 ASSESSMENT — ENCOUNTER SYMPTOMS
PSYCHIATRIC NEGATIVE: 0
ENDOCRINE NEGATIVE: 0
GASTROINTESTINAL NEGATIVE: 0
RESPIRATORY NEGATIVE: 0
EYES NEGATIVE: 1
MUSCULOSKELETAL NEGATIVE: 0
HEMATOLOGIC/LYMPHATIC NEGATIVE: 0
NEUROLOGICAL NEGATIVE: 0
CARDIOVASCULAR NEGATIVE: 0
ALLERGIC/IMMUNOLOGIC NEGATIVE: 0
CONSTITUTIONAL NEGATIVE: 0

## 2024-05-03 ASSESSMENT — REFRACTION_WEARINGRX
OD_SPHERE: -5.25
OS_ADD: +2.50
OS_AXIS: 135
OD_ADD: +2.50
OD_CYLINDER: SPHERE
OS_CYLINDER: -0.75
OS_SPHERE: -3.00

## 2024-05-03 ASSESSMENT — TONOMETRY
IOP_METHOD: GOLDMANN APPLANATION
OD_IOP_MMHG: 17
OS_IOP_MMHG: 18

## 2024-05-03 ASSESSMENT — REFRACTION_MANIFEST
OD_CYLINDER: SPHERE
OS_AXIS: 135
OD_ADD: +2.50
OD_SPHERE: -6.25
OS_SPHERE: -3.50
OS_CYLINDER: -0.75
OS_ADD: +2.50

## 2024-05-03 ASSESSMENT — CUP TO DISC RATIO
OS_RATIO: .7
OD_RATIO: .7

## 2024-05-03 ASSESSMENT — VISUAL ACUITY
OS_BAT_MED: 20/30
METHOD: SNELLEN - LINEAR
CORRECTION_TYPE: GLASSES
OS_CC: 20/40
OD_BAT_MED: 20/60
OD_CC: 20/50

## 2024-05-03 ASSESSMENT — PACHYMETRY
OS_CT(UM): 585
OD_CT(UM): 594

## 2024-05-03 ASSESSMENT — EXTERNAL EXAM - LEFT EYE: OS_EXAM: NORMAL

## 2024-05-03 ASSESSMENT — EXTERNAL EXAM - RIGHT EYE: OD_EXAM: NORMAL

## 2024-05-03 NOTE — PROGRESS NOTES
POAG OD. Brimonidine 0.2% OU QD and using intermittently. Had bradycardia from timolol. IOP 17/18 Tmax 18/18 pachy adjust -2 OU. Has stopped steroids for cancer treatment.      Optical coherence tomography of the retinal nerve fiber layer (RNFL) revealed:    OD: Reduced thickness in superior sectors with an average RNFL thickness of 76 was 76 was 78 micron.   OS: Reduced thickness in superior sectors with an average RNFL thickness of 76 was 75 was 77 micron.     A Beavers 24-2 threshold visual field test was done.  Results were:   OD: the absence of scotoma, pattern standard deviation 2.20 dB   OS: the absence of scotoma, pattern standard deviation 3.01 dB    PSC cataracts worsening. VA corrects to 20/40 OD 20/30 OS 20/30 OD and 20/20 OS. BAT 20/60 OD 20/30 OS, 20/40 OD 20/30 OS. A spectacle prescription was dispensed to be used as needed. Recommend to defer getting new glasses.     Patient drives to work [place] [employer] [place].   Hx of plaquenil therapy but DCd and no need for testing.     Non hodgkin lymphoma in remission.   Dermatochalasis OS.OD and pt is symptomatic.     Per patient request get glasses and not spend a lot of money on them had large refractive change and use only for driving.     OCT RNFL is stable. OD qualifies for cataract surgery. Patient is motivated. Referred to Dr. Orellana for cataract pre-op testing.

## 2024-05-14 NOTE — PROGRESS NOTES
Patient ID: Sunni Hearn is a 73 y.o. female.    Subjective    The patient has a long history of joint and back pain, and possible autoimmune disease. In November 2022, she experience pain in the right hip, which was  new. Over the next few months, it progressed to cause moderate to severe pain in the right foot, leg, thigh, and hip, most severely affecting the foot. She was given various treatments, including gabapentin, roboxin, steroid. However, none has helped.  In addition she underwent CT, MRI, and later PET/CT to work up the pain (Imani Llanes, Rajat, [place]). Notably, PET/CT on 1/19/2023 shows multiple FDG avid lymph nodes, soft tissue mass, osseous foci, and spleen. These were suspicious for malignancy,  particularly lymphoma. A biopsy was done on Jan 30, 2023,  on BONE, ILIAC, RIGHT, which was FDG avid. An initial biopsy of the bone was not diagnostic. She subsequently had another biopsy for a preverbebral  mass. Final diagnosis is DLBCL. FISH tests for BCL2 and MYC translocations are pending. Stage IV. IPI 3 (age, extranodal sites, stage).      She initiated therapy with  R-CHOP on 3/3/2023. Had significant nausea and vomiting for the first 3 days post chemo, relieved with iv Zofran. subsequently treatments were well tolerated. No  dose adjustment was needed. C6 was given on 6/16/2023.       today she has neuropathy in her fingertips and bottoms of her feet (not in her toes). She is having difficulty picking up small objects and dropping items. This remains unchanged.     Continues to report urinary incontinence. Was evaluated by Urology who recommended bladder mesh surgery. She has consented to this option and will have the procedure on 8/25.     She had a IT on 8/18. The procedure was not well tolerated, and she is not willing to have more. CSF results shows no evidence of lymphoma.      Today she states she has improved in general. Numbness in the right foot and fingertips are improving without  meds. Off gabapentin now. Denies fever, chills, night sweats, weight loss, headaches, visual disturbances, hearing changes,  chest pain, dyspnea, cough, sputum production, hemoptysis, palpitations, syncope/near-syncope, peripheral edema, nausea/vomiting/diarrhea, constipation, abdominal pain, melena, hematochezia, hematuria, noticeable lymphadenopathy, rash, dizziness, and  balance/gait disturbance.     Treatment synopsis: [facility] 3/3 to 6/16/2023            Objective    BSA: 1.97 meters squared  /73 (BP Location: Left arm, Patient Position: Sitting, BP Cuff Size: Adult)   Pulse 66   Temp 36 °C (96.8 °F) (Skin)   Resp 16   Wt 82.2 kg (181 lb 3.2 oz)   SpO2 99%   BMI 28.38 kg/m²      Physical Exam  Constitutional:       General: She is not in acute distress.     Appearance: She is not toxic-appearing.   HENT:      Head: Normocephalic.      Nose: Nose normal.      Mouth/Throat:      Mouth: Mucous membranes are moist.   Eyes:      Extraocular Movements: Extraocular movements intact.      Pupils: Pupils are equal, round, and reactive to light.   Cardiovascular:      Rate and Rhythm: Normal rate and regular rhythm.      Heart sounds: No murmur heard.  Pulmonary:      Effort: Pulmonary effort is normal.      Breath sounds: Normal breath sounds.   Abdominal:      General: Bowel sounds are normal.      Palpations: Abdomen is soft. There is no mass.      Tenderness: There is no abdominal tenderness. There is no rebound.   Musculoskeletal:         General: No swelling, tenderness, deformity or signs of injury.      Right lower leg: No edema.      Left lower leg: No edema.   Skin:     Coloration: Skin is not jaundiced.      Findings: No bruising, lesion or rash.   Neurological:      Mental Status: She is alert and oriented to person, place, and time.      Cranial Nerves: No cranial nerve deficit.      Motor: No weakness.      Gait: Gait normal.   Psychiatric:         Mood and Affect: Mood normal.         Performance  Status:  Asymptomatic      Assessment/Plan   Newly diagnosed lymphoma      #Lymphoma   Final diagnosis is DLBCL. FISH tests for BCL2 and MYC translocations are pending.   Stage IV.   IPI 3 (age, extranodal sites, stage).  Reviewed the results, natural history of DLBCL. stressed it is very treatable, and potentially curable in up to 70% patients.   Discussed PET/CT results. It's a CR. Very encouraging news. Will need follow up PET/CT q6mon.  -discussed results of PET done on jan 5, 2024. It confirms CR. Next PET/CT will be due on July 2024.   -Will need to repeat PET/CT in July/Aug 2024. Due to extranodal involvement, CT less informative.     #treatment  The goal is cure. The treatment recommended is R-CHOP x 6 cycles. given q3wks. She will need GCSF and other supportive care.   AEs of drugs explained in details. In particular, the following possible AEs were discussed: severe infection, death, PML, HBV reactivation, cardiac injury, renal injury, mucositis, neuropathy (sensory, motor, autonomic), secondary tumors including leukemia.  These may happen at various frequency. will make best efforts to monitor, prevent, and treat AEs.    c1 was associated with nausea and vomiting. Pt should take zofran for 5 days at the scheduled twice a day doses, instead of the as needed dose schedule.  C2 given on 3/24/2023 was well tolerated. G1 sensory neuropathy was reported. No need to adjust vinca dose.  C3 given 4/14/23  C4 given 5/5/23 Vincristine dose reduced with C4 for grade 2 neuropathy.   C5 given 5/26/23   C6 given 6/16/23  Discussed IT therapy. Overall the AE is rare and is well tolerated. She consented and will start treatment on 8/18 and 9/11. He will need CT head, and labs before these. She received one, but is not willing to have 2nd. CSF on 8/18 was negative, with no evidence of lymphoma.     #Peripheral neuropathy  -More severe after chemo. Off gabapentin. Improving.         Plan:   -PET/CT in 8/5/2024.  -RTC after  PET.  -labs.     Time spent > 35 min, with more than 50% on counseling and coordinating care.     Cancer Staging   No matching staging information was found for the patient.      Oncology History    No history exists.                 Celestino Rawls MD PhD

## 2024-06-05 ENCOUNTER — HOSPITAL ENCOUNTER (EMERGENCY)
Facility: HOSPITAL | Age: 74
Discharge: HOME | End: 2024-06-05
Payer: MEDICARE

## 2024-06-05 VITALS
SYSTOLIC BLOOD PRESSURE: 147 MMHG | DIASTOLIC BLOOD PRESSURE: 87 MMHG | BODY MASS INDEX: 27.31 KG/M2 | HEART RATE: 85 BPM | RESPIRATION RATE: 16 BRPM | OXYGEN SATURATION: 100 % | TEMPERATURE: 99 F | HEIGHT: 67 IN | WEIGHT: 174 LBS

## 2024-06-05 DIAGNOSIS — S61.209A AVULSION OF SKIN OF FINGER, INITIAL ENCOUNTER: Primary | ICD-10-CM

## 2024-06-05 PROCEDURE — 99282 EMERGENCY DEPT VISIT SF MDM: CPT | Mod: 25

## 2024-06-05 PROCEDURE — 2500000001 HC RX 250 WO HCPCS SELF ADMINISTERED DRUGS (ALT 637 FOR MEDICARE OP): Performed by: PHYSICIAN ASSISTANT

## 2024-06-05 PROCEDURE — 64450 NJX AA&/STRD OTHER PN/BRANCH: CPT | Performed by: PHYSICIAN ASSISTANT

## 2024-06-05 PROCEDURE — 2500000005 HC RX 250 GENERAL PHARMACY W/O HCPCS: Performed by: PHYSICIAN ASSISTANT

## 2024-06-05 RX ORDER — LIDOCAINE HYDROCHLORIDE 10 MG/ML
5 INJECTION INFILTRATION; PERINEURAL ONCE
Status: COMPLETED | OUTPATIENT
Start: 2024-06-05 | End: 2024-06-05

## 2024-06-05 RX ORDER — CEPHALEXIN 500 MG/1
500 CAPSULE ORAL 3 TIMES DAILY
Qty: 15 CAPSULE | Refills: 0 | Status: SHIPPED | OUTPATIENT
Start: 2024-06-05 | End: 2024-06-10

## 2024-06-05 RX ORDER — BACITRACIN ZINC 500 UNIT/G
1 OINTMENT IN PACKET (EA) TOPICAL ONCE
Status: COMPLETED | OUTPATIENT
Start: 2024-06-05 | End: 2024-06-05

## 2024-06-05 RX ADMIN — LIDOCAINE HYDROCHLORIDE 50 MG: 10 INJECTION, SOLUTION INFILTRATION; PERINEURAL at 19:05

## 2024-06-05 RX ADMIN — BACITRACIN ZINC 1 APPLICATION: 500 OINTMENT TOPICAL at 19:50

## 2024-06-05 ASSESSMENT — PAIN SCALES - GENERAL: PAINLEVEL_OUTOF10: 6

## 2024-06-05 ASSESSMENT — COLUMBIA-SUICIDE SEVERITY RATING SCALE - C-SSRS
1. IN THE PAST MONTH, HAVE YOU WISHED YOU WERE DEAD OR WISHED YOU COULD GO TO SLEEP AND NOT WAKE UP?: NO
6. HAVE YOU EVER DONE ANYTHING, STARTED TO DO ANYTHING, OR PREPARED TO DO ANYTHING TO END YOUR LIFE?: NO
2. HAVE YOU ACTUALLY HAD ANY THOUGHTS OF KILLING YOURSELF?: NO

## 2024-06-05 ASSESSMENT — PAIN - FUNCTIONAL ASSESSMENT: PAIN_FUNCTIONAL_ASSESSMENT: 0-10

## 2024-06-05 NOTE — ED PROVIDER NOTES
HPI   Chief Complaint   Patient presents with    Hand Pain       73-year-old female laceration left fifth finger.  No active bleeding.  No functional deficit.  Last tetanus 2021 per EMR.  No deformity.  No other complaints pain or injury.      History provided by:  Patient   used: No                        No data recorded                   Patient History   Past Medical History:   Diagnosis Date    Acute candidiasis of vulva and vagina     Vaginal yeast infection    Acute vaginitis 04/21/2016    Acute vaginitis    Anemia     Anxiety     COVID-19 12/01/2020    COVID-19    Dizziness     GERD (gastroesophageal reflux disease)     Hyperlipidemia     Hypertension     Localized swelling, mass and lump, left lower limb 02/15/2019    Ankle mass, left    Non Hodgkin's lymphoma (Multi)     Orthostatic lightheadedness 11/17/2023    Other bursal cyst, unspecified site 03/07/2019    Synovial cyst    Other conditions influencing health status 06/06/2013    Nontoxic Goiter    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    Pain in right knee 12/23/2015    Knee pain, right    Personal history of diseases of the blood and blood-forming organs and certain disorders involving the immune mechanism 12/10/2015    History of anemia    Personal history of other diseases of the circulatory system 04/07/2016    History of sinus tachycardia    Personal history of other diseases of the musculoskeletal system and connective tissue 06/20/2013    History of tendinitis    Personal history of other diseases of the respiratory system 11/18/2020    History of acute sinusitis    Personal history of other mental and behavioral disorders 01/14/2021    History of anxiety disorder    Personal history of other specified conditions 04/07/2016    History of bradycardia    Personal history of other specified conditions 12/01/2020    History of fatigue    Personal history of other specified conditions  12/10/2015    History of dysuria    Personal history of urinary (tract) infections 2018    History of urinary tract infection    Preglaucoma, unspecified, bilateral 2015    Glaucoma suspect, both eyes    Presence of spectacles and contact lenses 2015    Uses contact lenses    Primary open-angle glaucoma, bilateral, mild stage 10/12/2015    Primary open angle glaucoma of both eyes, mild stage    Primary open-angle glaucoma, bilateral, mild stage 10/12/2015    Primary open angle glaucoma of both eyes, mild stage    Primary open-angle glaucoma, bilateral, mild stage 10/12/2015    Primary open angle glaucoma of both eyes, mild stage    Primary open-angle glaucoma, bilateral, mild stage 10/13/2015    Primary open angle glaucoma of both eyes, mild stage    Primary open-angle glaucoma, unspecified eye, stage unspecified 10/13/2015    POAG (primary open-angle glaucoma)    Right lower quadrant pain 2015    Abdominal pain, right lower quadrant     Past Surgical History:   Procedure Laterality Date     SECTION, CLASSIC  2013     Section    CT GUIDED PERCUTANEOUS BIOPSY BONE DEEP  2023    CT GUIDED PERCUTANEOUS BIOPSY BONE DEEP 2023 [facility]    CT GUIDED PERCUTANEOUS BIOPSY BONE DEEP  2/10/2023    CT GUIDED PERCUTANEOUS BIOPSY BONE DEEP 2/10/2023 DOCTOR OFFICE LEGACY    OTHER SURGICAL HISTORY  2021    Knee replacement     No family history on file.  Social History     Tobacco Use    Smoking status: Never    Smokeless tobacco: Never   Substance Use Topics    Alcohol use: Not on file    Drug use: Not on file       Physical Exam   ED Triage Vitals [24 1728]   Temperature Heart Rate Respirations BP   37.2 °C (99 °F) 89 15 163/87      Pulse Ox Temp src Heart Rate Source Patient Position   98 % -- -- --      BP Location FiO2 (%)     -- --       Physical Exam  Vitals and nursing note reviewed.   Constitutional:       General: She is not in acute  distress.     Appearance: Normal appearance. She is not ill-appearing, toxic-appearing or diaphoretic.   Cardiovascular:      Rate and Rhythm: Normal rate.   Pulmonary:      Effort: Pulmonary effort is normal.   Musculoskeletal:         General: Signs of injury present. No swelling, tenderness or deformity. Normal range of motion.      Comments: Left fifth finger.  No bony tenderness or deformity.  No joint involvement.  Full range of motion with good strength on resistance.   Skin:     Comments: Laceration volar pad distal phalanx fifth finger left hand.  No active bleeding.  Nail and nailbed are not involved.  No joint involvement.  No deep structures exposed.   Neurological:      General: No focal deficit present.      Mental Status: She is alert and oriented to person, place, and time.   Psychiatric:         Mood and Affect: Mood normal.         Behavior: Behavior normal.         ED Course & MDM   Diagnoses as of 06/07/24 0851   Avulsion of skin of finger, initial encounter       Medical Decision Making  Patient is offered x-ray imaging which she declines.  There is no bony deformity.  No joint involvement.  No functional deficit.  Normal motor and sensory exam and neurovascular exam.  No suspected foreign body.    The laceration is a distally based partial-thickness/superficial skin flap which appears to be vascular and likely nonviable.  The underlying skin is intact with no suturable laceration.  There are no deep structures exposed.  Nail and nailbed are not involved.  Digital block lidocaine with adequate anesthesia.  Wound was cleansed explored.  As noted the distally based skin flap is avascular and was debrided.  Bacitracin Xeroform dressing.  Intact motor sensory and neurovascular exam.    Evaluation consistent with skin avulsion which was debrided and treated as above.  Intact motor sensory and neurovascular exam..  Stable for discharge outpatient management follow-up.        Procedure  Digital  Block    Performed by: Jarred Deng PA-C  Authorized by: Jarred Deng PA-C    Consent:     Consent obtained:  Verbal    Consent given by:  Patient  Universal protocol:     Procedure explained and questions answered to patient or proxy's satisfaction: yes      Patient identity confirmed:  Verbally with patient  Indications:     Indications:  Procedural anesthesia  Location:     Block location:  Finger  Pre-procedure details:     Neurovascular status: intact      Skin preparation:  Chlorhexidine  Procedure details:     Syringe type:  Luer lock syringe    Needle gauge:  25 G    Anesthetic injected:  Lidocaine 1% w/o epi    Technique:  Metacarpal block    Injection procedure:  Anatomic landmarks identified, incremental injection, negative aspiration for blood and introduced needle  Post-procedure details:     Outcome:  Anesthesia achieved    Procedure completion:  Tolerated well, no immediate complications  Comments:      Distally based flap laceration debrided.  Underlying skin intact thoroughly cleansed.  No deep structure exposed.  No foreign body.       Jarred Deng PA-C  06/07/24 0858

## 2024-06-12 ENCOUNTER — APPOINTMENT (OUTPATIENT)
Dept: OPHTHALMOLOGY | Facility: CLINIC | Age: 74
End: 2024-06-12
Payer: COMMERCIAL

## 2024-07-03 ENCOUNTER — APPOINTMENT (OUTPATIENT)
Dept: PRIMARY CARE | Facility: CLINIC | Age: 74
End: 2024-07-03
Payer: COMMERCIAL

## 2024-07-03 VITALS
SYSTOLIC BLOOD PRESSURE: 120 MMHG | TEMPERATURE: 96.6 F | OXYGEN SATURATION: 98 % | BODY MASS INDEX: 28.24 KG/M2 | HEIGHT: 67 IN | DIASTOLIC BLOOD PRESSURE: 68 MMHG | RESPIRATION RATE: 16 BRPM | WEIGHT: 179.9 LBS | HEART RATE: 57 BPM

## 2024-07-03 DIAGNOSIS — I10 ESSENTIAL (PRIMARY) HYPERTENSION: ICD-10-CM

## 2024-07-03 DIAGNOSIS — Z01.818 PREOPERATIVE EXAMINATION: Primary | ICD-10-CM

## 2024-07-03 PROCEDURE — 99214 OFFICE O/P EST MOD 30 MIN: CPT | Performed by: INTERNAL MEDICINE

## 2024-07-03 PROCEDURE — 3074F SYST BP LT 130 MM HG: CPT | Performed by: INTERNAL MEDICINE

## 2024-07-03 PROCEDURE — 3078F DIAST BP <80 MM HG: CPT | Performed by: INTERNAL MEDICINE

## 2024-07-03 PROCEDURE — 1036F TOBACCO NON-USER: CPT | Performed by: INTERNAL MEDICINE

## 2024-07-03 RX ORDER — GABAPENTIN 300 MG/1
300 CAPSULE ORAL
COMMUNITY

## 2024-07-03 ASSESSMENT — ENCOUNTER SYMPTOMS
OCCASIONAL FEELINGS OF UNSTEADINESS: 0
SHORTNESS OF BREATH: 0
PALPITATIONS: 0
DEPRESSION: 0
CHEST TIGHTNESS: 0
LOSS OF SENSATION IN FEET: 0
FATIGUE: 0

## 2024-07-03 ASSESSMENT — PATIENT HEALTH QUESTIONNAIRE - PHQ9
1. LITTLE INTEREST OR PLEASURE IN DOING THINGS: NOT AT ALL
SUM OF ALL RESPONSES TO PHQ9 QUESTIONS 1 AND 2: 0
2. FEELING DOWN, DEPRESSED OR HOPELESS: NOT AT ALL

## 2024-07-03 NOTE — PATIENT INSTRUCTIONS
Please  take your evening medication night before  surgery as usual. Day of surgery take  only metoprolol with sips of water before you go .Hold off motrim 7 days before surgery.

## 2024-07-03 NOTE — PROGRESS NOTES
"Subjective   Patient ID: Sunni Hearn is a 74 y.o. female who presents for surgical clearance.for left cataract surgery    HPI Pt is schedule on July 17 with Christina Luna at [facility] for left cataract surgery. Pt will receive IV sedation .   She is currently swimming with floating device 3 x week each 45. Her funtional capacity is good in water however unable to walk in land  due to neuropathy.  Most recent surgery was February 2024 with urology for sling placement. No perioperative complications or allergic reaction. Prior was colonoscopy in feb 2020.  Normal ecg in feb 2024.    Review of Systems   Constitutional:  Negative for fatigue.   Respiratory:  Negative for chest tightness and shortness of breath.    Cardiovascular:  Negative for chest pain and palpitations.   Neurological:         Persistent fingertips and feet numbness postchemo.   All other systems reviewed and are negative.      Objective   /68 (BP Location: Left arm, Patient Position: Sitting, BP Cuff Size: Adult)   Pulse 57   Temp 35.9 °C (96.6 °F)   Resp 16   Ht 1.702 m (5' 7\")   Wt 81.6 kg (179 lb 14.3 oz)   SpO2 98%   BMI 28.18 kg/m²     Physical Exam  Limping when walking  Eyes - conjunctivae clear, PERRLA  HEENT - oral cavity moist  Neck - no cervical lymphadenopathy,  thyromegaly  Axilla - no palpable lymphadenopathy  Cardiac- regular rate and rhythm, no murmurs, no carotid bruit, no JVP  Lung - clear to auscultation, no rales, no rhonchi, no wheezing  GI - normally active bowel sounds, non tender, non distended, no hepatosplenomegaly, no rebound  MSK - non deformities  Extremities - no edema, good distal pulses  Neuro - decreased superficial sensation in bilateral feet and finger tips, oriented x 3  Skin - no bruises, no rashes  Psychiatric - pleasant, well groom, no hallucinations    Assessment/Plan   Problem List Items Addressed This Visit             ICD-10-CM    Essential (primary) hypertension I10     Well " control         Preoperative examination - Primary Z01.818     Low surgical risk for upcoming surgical procedure. Pt advised to take her evening medication night before as usual. Day of surgery to take before going with sip of water only metoprolol. And hold off nsaids for 1 week.

## 2024-07-03 NOTE — ASSESSMENT & PLAN NOTE
Low surgical risk for upcoming surgical procedure. Pt advised to take her evening medication night before as usual. Day of surgery to take before going with sip of water only metoprolol. And hold off nsaids for 1 week.

## 2024-07-10 ENCOUNTER — APPOINTMENT (OUTPATIENT)
Dept: PRIMARY CARE | Facility: CLINIC | Age: 74
End: 2024-07-10
Payer: COMMERCIAL

## 2024-07-10 VITALS
BODY MASS INDEX: 28.58 KG/M2 | TEMPERATURE: 96.6 F | SYSTOLIC BLOOD PRESSURE: 120 MMHG | WEIGHT: 182.1 LBS | OXYGEN SATURATION: 98 % | HEART RATE: 71 BPM | DIASTOLIC BLOOD PRESSURE: 74 MMHG | HEIGHT: 67 IN | RESPIRATION RATE: 16 BRPM

## 2024-07-10 DIAGNOSIS — Z86.39 HISTORY OF HYPERGLYCEMIA: ICD-10-CM

## 2024-07-10 DIAGNOSIS — E55.9 VITAMIN D DEFICIENCY, UNSPECIFIED: ICD-10-CM

## 2024-07-10 DIAGNOSIS — R26.89 BALANCE DISORDER: ICD-10-CM

## 2024-07-10 DIAGNOSIS — K21.9 GERD WITHOUT ESOPHAGITIS: ICD-10-CM

## 2024-07-10 DIAGNOSIS — I10 ESSENTIAL (PRIMARY) HYPERTENSION: Primary | ICD-10-CM

## 2024-07-10 DIAGNOSIS — F41.8 DEPRESSION WITH ANXIETY: ICD-10-CM

## 2024-07-10 PROBLEM — U07.1 COVID-19: Status: RESOLVED | Noted: 2024-02-23 | Resolved: 2024-07-10

## 2024-07-10 PROCEDURE — 1036F TOBACCO NON-USER: CPT | Performed by: INTERNAL MEDICINE

## 2024-07-10 PROCEDURE — 3078F DIAST BP <80 MM HG: CPT | Performed by: INTERNAL MEDICINE

## 2024-07-10 PROCEDURE — 1159F MED LIST DOCD IN RCRD: CPT | Performed by: INTERNAL MEDICINE

## 2024-07-10 PROCEDURE — 3074F SYST BP LT 130 MM HG: CPT | Performed by: INTERNAL MEDICINE

## 2024-07-10 PROCEDURE — 99214 OFFICE O/P EST MOD 30 MIN: CPT | Performed by: INTERNAL MEDICINE

## 2024-07-10 ASSESSMENT — ENCOUNTER SYMPTOMS
DEPRESSION: 0
HEADACHES: 0
SLEEP DISTURBANCE: 0
CHEST TIGHTNESS: 0
SHORTNESS OF BREATH: 0
AGITATION: 0
PALPITATIONS: 0
LOSS OF SENSATION IN FEET: 0
OCCASIONAL FEELINGS OF UNSTEADINESS: 0

## 2024-07-10 ASSESSMENT — COLUMBIA-SUICIDE SEVERITY RATING SCALE - C-SSRS
6. HAVE YOU EVER DONE ANYTHING, STARTED TO DO ANYTHING, OR PREPARED TO DO ANYTHING TO END YOUR LIFE?: NO
2. HAVE YOU ACTUALLY HAD ANY THOUGHTS OF KILLING YOURSELF?: NO
1. IN THE PAST MONTH, HAVE YOU WISHED YOU WERE DEAD OR WISHED YOU COULD GO TO SLEEP AND NOT WAKE UP?: NO

## 2024-07-10 ASSESSMENT — PATIENT HEALTH QUESTIONNAIRE - PHQ9
1. LITTLE INTEREST OR PLEASURE IN DOING THINGS: NOT AT ALL
2. FEELING DOWN, DEPRESSED OR HOPELESS: NOT AT ALL
SUM OF ALL RESPONSES TO PHQ9 QUESTIONS 1 AND 2: 0

## 2024-07-10 NOTE — PATIENT INSTRUCTIONS
Please watch the amount of sweets. Continue gabapentin as currently. Start daily stool softener OTC docusate 100 mg cap. Complete RSV at your local pharmacy in September and come in October for influenza here.Start physical therapy for your balance.  Return for physical in January.

## 2024-07-10 NOTE — PROGRESS NOTES
"Subjective   Patient ID: Sunni Hearn is a 74 y.o. female who presents for AD (Adjustment Disorder) and Hypertension.    HPI Diet is very similar before and after chemo. Her apetite is less during summer. Etas small breakfast and lunch, standard dinner with any kind of protein, vegetables almost daily, infrequent salad, and starch few times/week. Sweets daily. ETOH none.   Currently swimming weather permited. For cold weather considering indoor pool or stationary bike  Sleeps more than before, denies feeling depress,  only worry if cancer comes back, some frustration with inability to use her hands as before.   Feels afraid of fall, her balance is not that well,  she walks in no straight line, uses cane for long distances.    Review of Systems   Respiratory:  Negative for chest tightness and shortness of breath.    Cardiovascular:  Negative for palpitations and leg swelling.   Gastrointestinal:         Heartburn asymptomatic on antiacids  Bm since chemo every 2 days   Neurological:  Negative for headaches.        Finger tips and toes paresthesias intermitently still on, increased gabapentin from 300 qday to tid last week.    Psychiatric/Behavioral:  Negative for agitation and sleep disturbance.    All other systems reviewed and are negative.      Objective   /74 (BP Location: Right arm, Patient Position: Sitting, BP Cuff Size: Adult)   Pulse 71   Temp 35.9 °C (96.6 °F)   Resp 16   Ht 1.702 m (5' 7\")   Wt 82.6 kg (182 lb 1.6 oz)   SpO2 98%   BMI 28.52 kg/m²     Physical Exam  Eyes - conjunctivae clear, PERRLA  HEENT - no impacted wax, wears wick,   Neck - no cervical lymphadenopathy,  thyromegaly  Axilla - no palpable lymphadenopathy  Cardiac- regular rate and rhythm, no murmurs, no carotid bruit, no JVP  Lung - clear to auscultation, no rales, no rhonchi, no wheezing  GI - normally active bowel sounds, non tender, non distended, no hepatosplenomegaly, no rebound  MSK - toes deformities  Extremities - " no edema, good distal pulses  Neuro - non focal, decreased deep sensorial on toes, also decreased superficial sensation in toes bilateral, oriented x 3  Skin - no bruises, no rashes, dry skin  Psychiatric - pleasant, well groom, no hallucinations    Assessment/Plan   Problem List Items Addressed This Visit             ICD-10-CM    Depression with anxiety F41.8     Continue current dose. Re eval next visit         GERD without esophagitis K21.9     Stable continue same med         Essential (primary) hypertension - Primary I10     Well control with current         Balance disorder R26.89     Risk of fall, referral place to start balance PT         Relevant Orders    Referral to Physical Therapy     Other Visit Diagnoses         Codes    History of hyperglycemia     Z86.39    Relevant Orders    Lipid panel    TSH with reflex to Free T4 if abnormal    Vitamin D deficiency, unspecified     E55.9    Relevant Orders    Vitamin D 25-Hydroxy,Total (for eval of Vitamin D levels)

## 2024-07-15 DIAGNOSIS — F41.8 OTHER SPECIFIED ANXIETY DISORDERS: ICD-10-CM

## 2024-07-15 RX ORDER — ESCITALOPRAM OXALATE 10 MG/1
10 TABLET ORAL EVERY EVENING
Qty: 90 TABLET | Refills: 1 | Status: SHIPPED | OUTPATIENT
Start: 2024-07-15

## 2024-07-17 ENCOUNTER — APPOINTMENT (OUTPATIENT)
Dept: OPHTHALMOLOGY | Facility: CLINIC | Age: 74
End: 2024-07-17
Payer: COMMERCIAL

## 2024-07-18 ENCOUNTER — APPOINTMENT (OUTPATIENT)
Dept: UROLOGY | Facility: CLINIC | Age: 74
End: 2024-07-18
Payer: COMMERCIAL

## 2024-07-19 DIAGNOSIS — N32.81 OAB (OVERACTIVE BLADDER): Primary | ICD-10-CM

## 2024-07-19 NOTE — TELEPHONE ENCOUNTER
Patient called and her samples of Gemtesa ran out. She said they worked well and would like a prescription sent to her pharmacy. It was pended to Dr Jolly to fill. Patient is aware.

## 2024-07-22 ENCOUNTER — APPOINTMENT (OUTPATIENT)
Dept: UROLOGY | Facility: CLINIC | Age: 74
End: 2024-07-22
Payer: COMMERCIAL

## 2024-07-23 ENCOUNTER — APPOINTMENT (OUTPATIENT)
Dept: OPHTHALMOLOGY | Facility: CLINIC | Age: 74
End: 2024-07-23
Payer: COMMERCIAL

## 2024-07-24 ENCOUNTER — CLINICAL SUPPORT (OUTPATIENT)
Dept: PHYSICAL THERAPY | Facility: CLINIC | Age: 74
End: 2024-07-24
Payer: COMMERCIAL

## 2024-07-24 DIAGNOSIS — C80.1 NEUROPATHY ASSOCIATED WITH CANCER (MULTI): ICD-10-CM

## 2024-07-24 DIAGNOSIS — R26.9 ABNORMALITY OF GAIT AND MOBILITY: Primary | ICD-10-CM

## 2024-07-24 DIAGNOSIS — G63 NEUROPATHY ASSOCIATED WITH CANCER (MULTI): ICD-10-CM

## 2024-07-24 DIAGNOSIS — R26.89 BALANCE DISORDER: ICD-10-CM

## 2024-07-24 PROCEDURE — 97161 PT EVAL LOW COMPLEX 20 MIN: CPT | Mod: GP

## 2024-07-24 PROCEDURE — 97530 THERAPEUTIC ACTIVITIES: CPT | Mod: GP

## 2024-07-24 ASSESSMENT — ENCOUNTER SYMPTOMS
OCCASIONAL FEELINGS OF UNSTEADINESS: 1
DEPRESSION: 0
LOSS OF SENSATION IN FEET: 1

## 2024-07-24 ASSESSMENT — PAIN SCALES - GENERAL: PAINLEVEL_OUTOF10: 0 - NO PAIN

## 2024-07-24 ASSESSMENT — PAIN - FUNCTIONAL ASSESSMENT: PAIN_FUNCTIONAL_ASSESSMENT: 0-10

## 2024-07-24 NOTE — PROGRESS NOTES
Physical Therapy    Physical Therapy Evaluation and Treatment    Patient Name: Sunni Hearn  MRN: [id number]  Today's Date: 7/24/2024  Time Calculation  Start Time: 0817  Stop Time: 0900  Time Calculation (min): 43 min  PT Evaluation Time Entry  PT Evaluation (Low) Time Entry: 35  PT Therapeutic Procedures Time Entry  Therapeutic Activity Time Entry: 8    Insurance: [facility] [employer] Ad[facility] [facility]  Plan of Care: 7/24/24 - 10/22/24  Authorization: 25 visits  Visit Number: 1     Assessment:   Sunni Hearn  is a 74 y.o. old patient who participated in a physical therapy evaluation today due to referral for balance disorder secondary to chemo-induced peripheral neuropathy. Patient with a past medical history of Non-Hodgkin's Lymphoma s/p chemo 2023, HTN, and Neuropathy. Patient presents with impaired strength, balance deficits, sensation loss bilateral distal LE, motor control deficits with ambulation, and decreased overall functional endurance. Due to these impairments, she has the following functional limitations and participation restrictions: gait deviations, increased fall risk based on ABC scale and STEADI, transfers, performing household/occupational activities, and performing some ADLS. Patient would benefit from skilled physical therapy services to improve above mentioned impairments and allow for independent and safe functional mobility. Utilization of skilled physical therapy services will aid in advancing her functional status and attaining her therapy-related goals. The patient verbalized understanding and is in agreement with all goals and plan of care. Plan of care was developed with input and agreement by the patient.    Plan:   OP PT Plan  Treatment/Interventions: Education/ Instruction, Gait training, Manual therapy, Neuromuscular re-education, Self care/ home management, Therapeutic activities, Therapeutic exercises  PT Plan: Skilled PT  PT Frequency: 1 time per week  Duration: 8-10 weeks  Onset  "Date: 07/10/24  Certification Period Start Date: 07/24/24  Certification Period End Date: 10/22/24  Rehab Potential: Fair (due to nature of referring diagnosis)  Plan of Care Agreement: Patient    Current Problem:  1. Abnormality of gait and mobility  Follow Up In Physical Therapy      2. Balance disorder  Referral to Physical Therapy    Follow Up In Physical Therapy      3. Neuropathy associated with cancer (Multi)  Follow Up In Physical Therapy            Subjective   Sunni Hearn  is a 74 y.o. female  presenting to the clinic with chief complaint of chemo induced peripheral neuropathy that began since chemo ended in June 2023. She reports bilateral hands limiting ADL/IADL tasks and bilateral distal LE which has impaired her balance and walking. Patient states that she had 1 fall going up the stairs as she missed a stair resulting in a fall without injury.    SCREENING:  -Hydration status: 16 oz / day  -Sleep: ~8 hrs / night  -Numbness/tingling: Yes - bilateral distal LE  -Headache/dizziness: No    PREVIOUS LEVEL OF FUNCTION:  Independent Living, Independent ADLs/IADLs, Independent Ambulation, (+) Driving, (+) Working as a Behavioral Therapist)  CURRENT LEVEL OF FUNCTION: (-) Driving due to cataract (sx scheduled for tomorrow)  CURRENT DME: cane      SOCIAL HISTORY/LIVING ARRANGEMENT:   Patient lives with  in a home with 5 COLIN + HR  Patient with first floor set-up bedroom/bathroom    ACTIVITY/PARTICIPATION LIMITATIONS: work duties (writing), household chores, holding objects, leisure activities (shopping)    EXERCISE/ACTIVITY LEVEL:  Swims 2x/week    PATIENT GOAL: \"get my balance back\"     General:  General  Reason for Referral: PT eval and treat for balance disorder  Referred By: Jayde Llanes MD  Preferred Learning Style: visual, written, auditory  General Comment: distal neuropathy post chemo - Non Hodgkin's lymphoma  Payor: [employer] / Plan: [employer] NATIONAL ADVANTAGE PROGRAM / Product Type: *No " Product type* /   Mario AlbertoJetJayde LACI    Precautions  Precautions  STEADI Fall Risk Score (The score of 4 or more indicates an increased risk of falling): 10  Precautions Comment: Fall Risk       Pain  Pain Assessment: 0-10  0-10 (Numeric) Pain Score: 0 - No pain       Objective     MUSCULOSKELETAL SYSTEM:      GROSS SYMMETRY:  stand/sitting:  rounded shoulders, forward head      PASSIVE RANGE OF MOTION: WFL BL LE        LE STRENGTH - tested in seated:  Muscle Right LE Left LE   Hip Flexion (L1-L2) 3/5 4-/5   Hip Abduction 4-/5 4/5   Hip Adduction 4/5 4/5   Knee Flexion (L3) 4-/5 4/5   Knee Extension (L4) 4-/5 4/5   Ankle Dorsiflexion (L5) 4-/5 4/5   Ankle Plantarflexion (S1) 3/5 3/5     NEUROMUSCULAR SYSTEM:      GROSS SENSATION:  Intact but diminished at S2 R/L to light touch R/L  Tested L2-S1 Bilateral     COORDINATION:    JORGE: normal bilateral   HTS: normal bilateral     PROPRIOCEPTION:  Intact R/L 3/3 at ankle     TRANSFERS:       SIT <> STAND:  Modified Independent       BALANCE:    SITTING:  Good   STANDING: Fair      GAIT:  Patient ambulated 40 feet with no device at Indep. Patient demonstrated fair shakila, decreased step length, decreased step height, wide B[place], foot flat at initial contact bilateral, decreased knee flexion in swing, and lateral path deviations        Outcome Measures:  Timed Up and Go Test  Observe the patient’s postural stability, gait, stride length, and sway. Select All that Apply:: Slow tentative pace  TUG Comment: 12.2 sec    Other Measures  5x Sit to Stand: 11.89 sec  10M Walk Test: 9.32 sec  Activities - Specific Balance Confidence Scale: 780 (48.75%)  Other Outcome Measures: MCTSIB: 120/120 with Mod Sway on Condition 4 requiring 3 trials    FIVE TIMES SIT TO STAND  Age Average Time  14-19  6.5 sec  20-24  6.0 sec  30-39  6.1 sec  40-49  7.6 sec  50-59  7.7 sec  60-69  7.8 sec  70-79  9.3 sec  80-85  10.8 sec  Fall Risk  Geriatrics >/= 12 sec  CVA          >/= 12 sec  Parkinson's  Disease     >/= 16 sec  Vestibular <61 yo       >/= 10 sec  Vestibular >61 yo      >/= 14.2 sec    Modified CTSIB  Condition 1: Eyes Open, Firm Surface   Trial 1: 30 sec / 30 sec slight sway  Condition 2: Eyes Closed, Firm Surface   Trial 1: 30 sec / 30 sec mild sway  Condition 3: Eyes Open, Foam Surface  Trial 1: 30 sec / 30 sec slight sway  Condition 4: Eyes Closed, Foam Surface   Trial 1: 2 sec / 30 sec  Mod sway  Trial 2: 6 sec / 30 sec Mod sway  Trial 3: 30 sec / 30 sec Mod sway  Total: 120/120 sec    Treatments:  THERAPEUTIC ACTIVITY x 8 minutes  Sit to stand without hands x 5  3-way hip with UL UE support x 5 ea R/L  Education on sit to stand technique  Educated patient on purpose and benefits of neurologic physical therapy for balance d/t neuropathy  Educated patient on purpose of exercises and importance of regular daily home program compliance  Educated patient on importance of nutrition/hydration and the recommended daily amount of fluid being half of one's body weight in ounces (PT and pt set goal for 2-3 bottles of water per day in next 1-2 weeks)  Educated patient on multi-factorial nature of balance systems to maintain balance  Educated patient on peripheral neuropathy and its effects on balance and strength  Provided patient with blue therablock for bilateral hand strengthening/ as pt with c/o dropping objects d/t neuropathy in hands. Provided patient with Dycem to assist with opening objects to complete cooking. Educated patient on use of copper fit compression for hands to assist with neuropathic pain   Educated patient on benefit of using SPC for balance and to decrease fall risk, pt expressed understanding  Provided patient with visual, verbal, and written instruction via printed home program handout to ensure carryover    EDUCATION:  Access Code: [email or web address]  URL: [email or web address]  Date: 07/24/2024  Prepared by: Galina Reynoso    Exercises  - Sit to Stand Without Arm Support  - 1 x  "daily - 7 x weekly - 3 sets - 10 reps  - Standing Hip Flexion with Counter Support  - 1 x daily - 7 x weekly - 3 sets - 10 reps  - Standing Hip Abduction with Counter Support  - 1 x daily - 7 x weekly - 3 sets - 10 reps  - Standing Hip Extension with Counter Support  - 1 x daily - 7 x weekly - 3 sets - 10 reps  - Corner Balance Feet Together: Eyes Open With Head Turns  - 1 x daily - 7 x weekly - 3 sets - 10 reps  - Corner Balance Feet Together With Eyes Closed  - 1 x daily - 7 x weekly - 3 sets - 10 reps      Goals:  Active       PT Problem       Patient will maintain condition 4 on CTSIB-M for 30 sec in 1 trial with slight sway to demonstrate improvement in vestibular system for balance       Start:  07/24/24    Expected End:  10/22/24            Patient will perform 5xSTS test in </= 9 sec to demonstrate improved functional LE strength to assist with gait, stair negotiation, and functional transfers       Start:  07/24/24    Expected End:  10/22/24            Patient will perform TUG in </= 9 sec to demonstrate decreased fall risk       Start:  07/24/24    Expected End:  10/22/24            Patient will improve ABC Scale to 67% to demonstrate improved balance confidence        Start:  07/24/24    Expected End:  10/22/24            Patient will be Independent with home exercise program to demonstrate compliance and self-management       Start:  07/24/24    Expected End:  10/22/24            Patient Stated Goal -        Start:  07/24/24    Expected End:  10/22/24       \"Get my balance back\"               "

## 2024-07-25 ENCOUNTER — APPOINTMENT (OUTPATIENT)
Dept: PHYSICAL THERAPY | Facility: CLINIC | Age: 74
End: 2024-07-25
Payer: COMMERCIAL

## 2024-08-07 ENCOUNTER — APPOINTMENT (OUTPATIENT)
Dept: RADIOLOGY | Facility: CLINIC | Age: 74
End: 2024-08-07
Payer: COMMERCIAL

## 2024-08-15 ENCOUNTER — APPOINTMENT (OUTPATIENT)
Dept: HEMATOLOGY/ONCOLOGY | Facility: HOSPITAL | Age: 74
End: 2024-08-15
Payer: COMMERCIAL

## 2024-08-30 ENCOUNTER — APPOINTMENT (OUTPATIENT)
Dept: UROLOGY | Facility: CLINIC | Age: 74
End: 2024-08-30
Payer: COMMERCIAL

## 2024-08-30 DIAGNOSIS — N39.3 SUI (STRESS URINARY INCONTINENCE, FEMALE): ICD-10-CM

## 2024-08-30 DIAGNOSIS — N32.81 OAB (OVERACTIVE BLADDER): ICD-10-CM

## 2024-08-30 DIAGNOSIS — G62.9 NEUROPATHY: Primary | ICD-10-CM

## 2024-08-30 PROCEDURE — 99214 OFFICE O/P EST MOD 30 MIN: CPT | Performed by: STUDENT IN AN ORGANIZED HEALTH CARE EDUCATION/TRAINING PROGRAM

## 2024-08-30 PROCEDURE — G2211 COMPLEX E/M VISIT ADD ON: HCPCS | Performed by: STUDENT IN AN ORGANIZED HEALTH CARE EDUCATION/TRAINING PROGRAM

## 2024-08-30 RX ORDER — TROSPIUM CHLORIDE ER 60 MG/1
60 CAPSULE ORAL
Qty: 30 CAPSULE | Refills: 11 | Status: SHIPPED | OUTPATIENT
Start: 2024-08-30 | End: 2025-08-30

## 2024-08-30 NOTE — PROGRESS NOTES
HISTORY OF PRESENT ILLNESS:  Sunni Hearn is a 74 y.o. female who presents today for a follow up visit.  She reports she is good during the day until it hits 5 pm. In the evening and overnight she has leakage. She is taking Gemtesa at night, but it costs her about 100 dollars a month.          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), Pain in right knee (12/23/2015), Personal history of diseases of the blood and blood-forming organs and certain disorders involving the immune mechanism (12/10/2015), Personal history of other diseases of the circulatory system (04/07/2016), Personal history of other diseases of the musculoskeletal system and connective tissue (06/20/2013), Personal history of other diseases of the respiratory system (11/18/2020), Personal history of other mental and behavioral disorders (01/14/2021), Personal history of other specified conditions (04/07/2016), Personal history of other specified conditions (12/01/2020), Personal history of other specified conditions (12/10/2015), Personal history of urinary (tract) infections (08/30/2018), Preglaucoma, unspecified, bilateral (09/02/2015), Presence of spectacles and contact lenses (06/25/2015), Primary open-angle glaucoma, bilateral, mild stage (10/12/2015), Primary open-angle glaucoma, bilateral, mild stage (10/12/2015), Primary open-angle glaucoma, bilateral, mild stage (10/12/2015), Primary open-angle glaucoma, bilateral, mild stage (10/13/2015),  "Primary open-angle glaucoma, unspecified eye, stage unspecified (10/13/2015), and Right lower quadrant pain (2015).    Surgical History  She has a past surgical history that includes  section, classic (2013); Other surgical history (2021); CT guided percutaneous biopsy bone deep (2023); and CT guided percutaneous biopsy bone deep (2/10/2023).     Social History  She reports that she has never smoked. She has never used smokeless tobacco. No history on file for alcohol use and drug use.    Family History  No family history on file.     Allergies  Codeine, Hydrochlorothiazide, Methylprednisolone, and Adhesive tape-silicones      A comprehensive 10+ review of systems was negative except for: see hpi                          PHYSICAL EXAMINATION:  BP Readings from Last 3 Encounters:   07/10/24 120/74   24 120/68   24 147/87      Wt Readings from Last 3 Encounters:   07/10/24 82.6 kg (182 lb 1.6 oz)   24 81.6 kg (179 lb 14.3 oz)   24 78.9 kg (174 lb)      BMI: Estimated body mass index is 28.52 kg/m² as calculated from the following:    Height as of 7/10/24: 1.702 m (5' 7\").    Weight as of 7/10/24: 82.6 kg (182 lb 1.6 oz).  BSA: Estimated body surface area is 1.98 meters squared as calculated from the following:    Height as of 7/10/24: 1.702 m (5' 7\").    Weight as of 7/10/24: 82.6 kg (182 lb 1.6 oz).  HEENT: Normocephalic, atraumatic, PER EOMI, nonicteric, trachea normal, thyroid normal, oropharynx normal.  CARDIAC: regular rate & rhythm, S1 & S2 normal.  No heaves, thrills, gallops or murmurs.  LUNGS: Clear to auscultation, no spinal or CV tenderness.  EXTREMITIES: No evidence of cyanosis, clubbing or edema.               Assessment:  Sunni Hearn is a 74 y.o. with mixed incontinence, positive CST     CARISA  -failed botox 100 units, detrol, myrbetriq (STM)  -UDS shows em, normal emptying   -S/p sling , did not have complete resolution of her stress symptoms, " Repeat UDS, show showed persistent stress incontinence,  -S/P sling 02/23/2024, doing well but has persistent urgency  -Given gemtesa and bladder training exercises  -We also discussed neuromodulation  -Rx trospium 60 mg XL in additional to gemtesa   -     Neuropathy:  Prescribed Lyrica  I will also refer her to integrative oncology  Referral to Dr. Day at [facility]       All questions and concerns were answered and addressed.  The patient expressed understanding and agrees with the plan.     Stefan Jolly MD    Scribe Attestation  By signing my name below, I, Lily Merritt, Scribe   attest that this documentation has been prepared under the direction and in the presence of Stefan Jolly MD.

## 2024-09-04 ENCOUNTER — APPOINTMENT (OUTPATIENT)
Dept: RADIOLOGY | Facility: CLINIC | Age: 74
End: 2024-09-04
Payer: COMMERCIAL

## 2024-09-12 ENCOUNTER — APPOINTMENT (OUTPATIENT)
Dept: HEMATOLOGY/ONCOLOGY | Facility: HOSPITAL | Age: 74
End: 2024-09-12
Payer: COMMERCIAL

## 2024-09-20 DIAGNOSIS — R30.0 DYSURIA: ICD-10-CM

## 2024-09-25 ENCOUNTER — APPOINTMENT (OUTPATIENT)
Dept: INTEGRATIVE MEDICINE | Facility: CLINIC | Age: 74
End: 2024-09-25
Payer: COMMERCIAL

## 2024-09-25 DIAGNOSIS — G62.9 NEUROPATHY: ICD-10-CM

## 2024-09-25 DIAGNOSIS — C83.38 DIFFUSE LARGE B-CELL LYMPHOMA OF LYMPH NODES OF MULTIPLE REGIONS (MULTI): Primary | ICD-10-CM

## 2024-09-25 PROCEDURE — 99205 OFFICE O/P NEW HI 60 MIN: CPT | Performed by: HOSPITALIST

## 2024-09-25 NOTE — PROGRESS NOTES
Patient ID: Sunni Hearn is a 74 y.o. female.  Referring Physician: No referring provider defined for this encounter.  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  There were no vitals taken for this visit.    PHYSICAL EXAM:  NAD, awake/alert  HEENT, NCAT, OP clear, no oral lesions  CTA bilat  RRR no mgr  Abd soft/nt/nd+bs  No c/c/e/ttp  Motor/sensory intact, CN 2-12 intact     RESULTS:  Lab Results   Component Value Date    WBC 5.6 05/02/2024    HGB 11.5 (L) 05/02/2024    HCT 35.9 (L) 05/02/2024     05/02/2024     05/02/2024    CREATININE 0.63 05/02/2024    AST 15 05/02/2024       Integrative Labs:    Functional Tests:    Assessment/Plan   73 yo woman with DLBCL, stage IV, IPI 3   RCHOP x 6 6/23 completed   IT chemo completed x 2 9/11/23    CANCER SPECIFIC RECCS:  LIFESTYLE:  Diet - 5-9 fruits/veg/day (7 veg to 2 fruits)  Cruciferous Vegetables - Brussel Sprouts, Kale, Broccoli, Cauliflower  Plant based anti-inflammatory whole foods diet  AICR New American Plate  PHYSICAL ACTIVITY 30 MIN/DAY    Reading:  Anticancer Living  Cancer Fighting Kitchen    Websites:  Cook for your Life  [email or web address].[email or web address]    SYMPTOM  MANAGEMENT:  Neuropathy    Integrative Modalities:  Acupuncture (first covered by insurance, then group acupuncture rate) - weekly for 4-6 weeks  Scrambler Therapy  Rehab physician - Dr. Landers    Natural Products to Consider:    Follow up: IO 3 mo    Thank you for consulting me in for this patient  Lane Day MD

## 2024-09-26 ENCOUNTER — TELEPHONE (OUTPATIENT)
Dept: HEMATOLOGY/ONCOLOGY | Facility: HOSPITAL | Age: 74
End: 2024-09-26
Payer: COMMERCIAL

## 2024-09-26 NOTE — TELEPHONE ENCOUNTER
Rn called patient and left message about visit on 10/11. MD wants to change to phone visit on 10/15. RN will attempt to call patient back again to get visit changed.

## 2024-09-27 ENCOUNTER — TELEPHONE (OUTPATIENT)
Dept: HEMATOLOGY/ONCOLOGY | Facility: HOSPITAL | Age: 74
End: 2024-09-27
Payer: COMMERCIAL

## 2024-09-27 NOTE — TELEPHONE ENCOUNTER
Attempted to call again to change visit on 10/11 to a phone visit on 10/15 per MD request. Will send a [employer] message to patient as well

## 2024-10-01 ENCOUNTER — TELEPHONE (OUTPATIENT)
Dept: PHYSICAL MEDICINE AND REHAB | Facility: CLINIC | Age: 74
End: 2024-10-01
Payer: COMMERCIAL

## 2024-10-01 ENCOUNTER — TELEPHONE (OUTPATIENT)
Dept: PAIN MEDICINE | Facility: CLINIC | Age: 74
End: 2024-10-01
Payer: COMMERCIAL

## 2024-10-01 NOTE — TELEPHONE ENCOUNTER
Called patient and left 2nd message to return call to schedule referral appointment from Dr. Day.  Awaiting return call.

## 2024-10-01 NOTE — TELEPHONE ENCOUNTER
I left a voicemail for the pt to call the office to make a np appt for a consult for scrambler therapy.

## 2024-10-04 ENCOUNTER — LAB (OUTPATIENT)
Dept: LAB | Facility: LAB | Age: 74
End: 2024-10-04
Payer: COMMERCIAL

## 2024-10-04 ENCOUNTER — HOSPITAL ENCOUNTER (OUTPATIENT)
Dept: RADIOLOGY | Facility: CLINIC | Age: 74
Discharge: HOME | End: 2024-10-04
Payer: COMMERCIAL

## 2024-10-04 DIAGNOSIS — C83.38 DIFFUSE LARGE B-CELL LYMPHOMA OF LYMPH NODES OF MULTIPLE REGIONS (MULTI): ICD-10-CM

## 2024-10-04 DIAGNOSIS — E55.9 VITAMIN D DEFICIENCY, UNSPECIFIED: ICD-10-CM

## 2024-10-04 DIAGNOSIS — Z86.39 HISTORY OF HYPERGLYCEMIA: ICD-10-CM

## 2024-10-04 LAB
25(OH)D3 SERPL-MCNC: 40 NG/ML (ref 30–100)
ALBUMIN SERPL BCP-MCNC: 4.2 G/DL (ref 3.4–5)
ALP SERPL-CCNC: 57 U/L (ref 33–136)
ALT SERPL W P-5'-P-CCNC: 12 U/L (ref 7–45)
ANION GAP SERPL CALC-SCNC: 14 MMOL/L (ref 10–20)
AST SERPL W P-5'-P-CCNC: 16 U/L (ref 9–39)
BASOPHILS # BLD AUTO: 0.04 X10*3/UL (ref 0–0.1)
BASOPHILS NFR BLD AUTO: 0.7 %
BILIRUB SERPL-MCNC: 0.4 MG/DL (ref 0–1.2)
BUN SERPL-MCNC: 22 MG/DL (ref 6–23)
CALCIUM SERPL-MCNC: 9.6 MG/DL (ref 8.6–10.6)
CHLORIDE SERPL-SCNC: 108 MMOL/L (ref 98–107)
CHOLEST SERPL-MCNC: 159 MG/DL (ref 0–199)
CHOLESTEROL/HDL RATIO: 3.2
CO2 SERPL-SCNC: 26 MMOL/L (ref 21–32)
CREAT SERPL-MCNC: 0.62 MG/DL (ref 0.5–1.05)
EGFRCR SERPLBLD CKD-EPI 2021: >90 ML/MIN/1.73M*2
EOSINOPHIL # BLD AUTO: 0.17 X10*3/UL (ref 0–0.4)
EOSINOPHIL NFR BLD AUTO: 3 %
ERYTHROCYTE [DISTWIDTH] IN BLOOD BY AUTOMATED COUNT: 14.1 % (ref 11.5–14.5)
GLUCOSE SERPL-MCNC: 97 MG/DL (ref 74–99)
HCT VFR BLD AUTO: 38.9 % (ref 36–46)
HDLC SERPL-MCNC: 49.5 MG/DL
HGB BLD-MCNC: 12.4 G/DL (ref 12–16)
IMM GRANULOCYTES # BLD AUTO: 0.06 X10*3/UL (ref 0–0.5)
IMM GRANULOCYTES NFR BLD AUTO: 1.1 % (ref 0–0.9)
LDH SERPL L TO P-CCNC: 162 U/L (ref 84–246)
LDLC SERPL CALC-MCNC: 91 MG/DL
LYMPHOCYTES # BLD AUTO: 1.11 X10*3/UL (ref 0.8–3)
LYMPHOCYTES NFR BLD AUTO: 19.8 %
MCH RBC QN AUTO: 28.6 PG (ref 26–34)
MCHC RBC AUTO-ENTMCNC: 31.9 G/DL (ref 32–36)
MCV RBC AUTO: 90 FL (ref 80–100)
MONOCYTES # BLD AUTO: 0.56 X10*3/UL (ref 0.05–0.8)
MONOCYTES NFR BLD AUTO: 10 %
NEUTROPHILS # BLD AUTO: 3.68 X10*3/UL (ref 1.6–5.5)
NEUTROPHILS NFR BLD AUTO: 65.4 %
NON HDL CHOLESTEROL: 110 MG/DL (ref 0–149)
NRBC BLD-RTO: 0 /100 WBCS (ref 0–0)
PLATELET # BLD AUTO: 214 X10*3/UL (ref 150–450)
POTASSIUM SERPL-SCNC: 4.5 MMOL/L (ref 3.5–5.3)
PROT SERPL-MCNC: 6.4 G/DL (ref 6.4–8.2)
RBC # BLD AUTO: 4.33 X10*6/UL (ref 4–5.2)
SODIUM SERPL-SCNC: 143 MMOL/L (ref 136–145)
T4 FREE SERPL-MCNC: 1.03 NG/DL (ref 0.78–1.48)
TRIGL SERPL-MCNC: 95 MG/DL (ref 0–149)
TSH SERPL-ACNC: 0.21 MIU/L (ref 0.44–3.98)
VLDL: 19 MG/DL (ref 0–40)
WBC # BLD AUTO: 5.6 X10*3/UL (ref 4.4–11.3)

## 2024-10-04 PROCEDURE — 85025 COMPLETE CBC W/AUTO DIFF WBC: CPT

## 2024-10-04 PROCEDURE — 83615 LACTATE (LD) (LDH) ENZYME: CPT

## 2024-10-04 PROCEDURE — 84439 ASSAY OF FREE THYROXINE: CPT

## 2024-10-04 PROCEDURE — 36415 COLL VENOUS BLD VENIPUNCTURE: CPT

## 2024-10-04 PROCEDURE — 78815 PET IMAGE W/CT SKULL-THIGH: CPT | Mod: PS

## 2024-10-04 PROCEDURE — 3430000001 HC RX 343 DIAGNOSTIC RADIOPHARMACEUTICALS: Performed by: INTERNAL MEDICINE

## 2024-10-04 PROCEDURE — 80053 COMPREHEN METABOLIC PANEL: CPT

## 2024-10-04 PROCEDURE — A9552 F18 FDG: HCPCS | Performed by: INTERNAL MEDICINE

## 2024-10-04 PROCEDURE — 82306 VITAMIN D 25 HYDROXY: CPT

## 2024-10-04 PROCEDURE — 80061 LIPID PANEL: CPT

## 2024-10-04 PROCEDURE — 84443 ASSAY THYROID STIM HORMONE: CPT

## 2024-10-04 RX ORDER — FLUDEOXYGLUCOSE F 18 200 MCI/ML
12.02 INJECTION, SOLUTION INTRAVENOUS
Status: COMPLETED | OUTPATIENT
Start: 2024-10-04 | End: 2024-10-04

## 2024-10-11 ENCOUNTER — APPOINTMENT (OUTPATIENT)
Dept: HEMATOLOGY/ONCOLOGY | Facility: HOSPITAL | Age: 74
End: 2024-10-11
Payer: COMMERCIAL

## 2024-10-18 ENCOUNTER — APPOINTMENT (OUTPATIENT)
Dept: PHYSICAL MEDICINE AND REHAB | Facility: CLINIC | Age: 74
End: 2024-10-18
Payer: COMMERCIAL

## 2024-10-30 DIAGNOSIS — M54.50 CHRONIC BILATERAL LOW BACK PAIN WITHOUT SCIATICA: ICD-10-CM

## 2024-10-30 DIAGNOSIS — G89.29 CHRONIC BILATERAL LOW BACK PAIN WITHOUT SCIATICA: ICD-10-CM

## 2024-10-30 DIAGNOSIS — R26.89 BALANCE DISORDER: Primary | ICD-10-CM

## 2024-10-31 DIAGNOSIS — G89.29 CHRONIC BILATERAL LOW BACK PAIN WITHOUT SCIATICA: ICD-10-CM

## 2024-10-31 DIAGNOSIS — M54.50 CHRONIC BILATERAL LOW BACK PAIN WITHOUT SCIATICA: ICD-10-CM

## 2024-10-31 RX ORDER — GABAPENTIN 300 MG/1
CAPSULE ORAL
Qty: 270 CAPSULE | Refills: 0 | Status: SHIPPED | OUTPATIENT
Start: 2024-10-31

## 2024-11-01 ENCOUNTER — APPOINTMENT (OUTPATIENT)
Dept: PHYSICAL MEDICINE AND REHAB | Facility: CLINIC | Age: 74
End: 2024-11-01
Payer: COMMERCIAL

## 2024-11-01 VITALS — DIASTOLIC BLOOD PRESSURE: 91 MMHG | RESPIRATION RATE: 18 BRPM | SYSTOLIC BLOOD PRESSURE: 167 MMHG | HEART RATE: 81 BPM

## 2024-11-01 DIAGNOSIS — R26.89 IMPAIRED GAIT AND MOBILITY: ICD-10-CM

## 2024-11-01 DIAGNOSIS — G62.9 NEUROPATHY: Primary | ICD-10-CM

## 2024-11-01 DIAGNOSIS — C83.38 DIFFUSE LARGE B-CELL LYMPHOMA OF LYMPH NODES OF MULTIPLE REGIONS (MULTI): ICD-10-CM

## 2024-11-01 DIAGNOSIS — G56.20 ULNAR NEUROPATHY AT ELBOW, UNSPECIFIED LATERALITY: ICD-10-CM

## 2024-11-01 PROCEDURE — 99204 OFFICE O/P NEW MOD 45 MIN: CPT | Performed by: PHYSICAL MEDICINE & REHABILITATION

## 2024-11-01 ASSESSMENT — PAIN SCALES - GENERAL: PAINLEVEL_OUTOF10: 0-NO PAIN

## 2024-11-01 NOTE — PROGRESS NOTES
Physical Medicine and Rehabilitation MSK Consult  11/01/24       Chief Complaint:  Low back pain     HPI:  Sunni Hearn is a  74 y.o. F w pmh of  DLBCL, stage IV, IPI sp RCHOP x 6 and completed in 6/23. This was followed by  IT chemo completed x 2 9/11/23  Per record she was dx after presenting with hip pain,   multiple FDG avid lymph nodes, soft tissue mass, osseous foci, and spleen.    She has neuropathy in her fingertips and bottoms of her feet (not in her toes). She is having difficulty picking up small objects and dropping items. This remains unchanged.          More neuropathy is in hands and feet. Symptoms started May 2023, before the last treatment.   States she has some good and bad days. Overall feels the severity and frequencty ahs been the same.   Worse  in the hands and feet.    States left and R worse in digits 4/5 but worse in the other fingers,. Numbness is main characteristics. Sometimes numbness up to elbpw  Sometimes they will tingling but no burning.    Sometimes drops things. Fine motor movement is off. Trouble writing.    States neuropathy is worse ankle down, whole foot right and toes. Equal among the toes.  No weakness in the feet,     Had to walk straight., able to self correct.    States had pt eval but then was busy w variety of other plans.      Numbness in hand and feet is sworse at night. Does have doron horses at night.    Gabapentin 300  mg tid. Not sure if helps.      No falls. Hsa a cane prn.    No neck or back pain.  Cramping in calves before bed.    Imaging  Reviewed 10/2024   PET Ct 10/2024  FINDINGS:  HEAD AND NECK:  No evidence of focal hypermetabolic lesion in the brain parenchyma,  noting that evaluation is limited because of the expected physiologic  diffuse FDG uptake in the brain. No focal hypermetabolic soft tissue  lesion is seen in the neck. No hypermetabolic cervical  lymphadenopathy is present.      CHEST:  No focal hypermetabolic lesion is seen in the lung  parenchyma.  No evidence of hypermetabolic mediastinal, hilar or axillary  lymphadenopathy.          ABDOMEN AND PELVIS:  No hypermetabolic soft tissue lesion is present in the abdomen and  pelvis. No evidence of hypermetabolic lymphadenopathy.  Physiologic radiotracer uptake is present in the liver and spleen  with excretion into the bowel loops and the genitourinary tract.          MUSCULOSKELETAL/EXTREMITIES:  No focal hypermetabolic lesion is seen in the axial or appendicular  to suggest osseous metastasis.          IMPRESSION:  No focal hypermetabolic activity throughout the body to suggest  recurrent lymphoproliferative disease. (Deauville score 1)     Past Medical History:   Diagnosis Date    Acute candidiasis of vulva and vagina     Vaginal yeast infection    Acute vaginitis 04/21/2016    Acute vaginitis    Anemia     Anxiety     COVID-19 12/01/2020    COVID-19    Dizziness     GERD (gastroesophageal reflux disease)     Hyperlipidemia     Hypertension     Localized swelling, mass and lump, left lower limb 02/15/2019    Ankle mass, left    Non Hodgkin's lymphoma     Orthostatic lightheadedness 11/17/2023    Other bursal cyst, unspecified site 03/07/2019    Synovial cyst    Other conditions influencing health status 06/06/2013    Nontoxic Goiter    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    Pain in right knee 12/23/2015    Knee pain, right    Personal history of diseases of the blood and blood-forming organs and certain disorders involving the immune mechanism 12/10/2015    History of anemia    Personal history of other diseases of the circulatory system 04/07/2016    History of sinus tachycardia    Personal history of other diseases of the musculoskeletal system and connective tissue 06/20/2013    History of tendinitis    Personal history of other diseases of the respiratory system 11/18/2020    History of acute sinusitis    Personal history of other mental and  behavioral disorders 2021    History of anxiety disorder    Personal history of other specified conditions 2016    History of bradycardia    Personal history of other specified conditions 2020    History of fatigue    Personal history of other specified conditions 12/10/2015    History of dysuria    Personal history of urinary (tract) infections 2018    History of urinary tract infection    Preglaucoma, unspecified, bilateral 2015    Glaucoma suspect, both eyes    Presence of spectacles and contact lenses 2015    Uses contact lenses    Primary open-angle glaucoma, bilateral, mild stage 10/12/2015    Primary open angle glaucoma of both eyes, mild stage    Primary open-angle glaucoma, bilateral, mild stage 10/12/2015    Primary open angle glaucoma of both eyes, mild stage    Primary open-angle glaucoma, bilateral, mild stage 10/12/2015    Primary open angle glaucoma of both eyes, mild stage    Primary open-angle glaucoma, bilateral, mild stage 10/13/2015    Primary open angle glaucoma of both eyes, mild stage    Primary open-angle glaucoma, unspecified eye, stage unspecified 10/13/2015    POAG (primary open-angle glaucoma)    Right lower quadrant pain 2015    Abdominal pain, right lower quadrant        Past Surgical History:   Procedure Laterality Date     SECTION, CLASSIC  2013     Section    CT GUIDED PERCUTANEOUS BIOPSY BONE DEEP  2023    CT GUIDED PERCUTANEOUS BIOPSY BONE DEEP 2023 [facility]CTOR OFFICE [facility]    CT GUIDED PERCUTANEOUS BIOPSY BONE DEEP  2/10/2023    CT GUIDED PERCUTANEOUS BIOPSY BONE DEEP 2/10/2023 DOCTOR OFFICE LEGACY    OTHER SURGICAL HISTORY  2021    Knee replacement        Patient Active Problem List    Diagnosis Date Noted    Balance disorder 07/10/2024    Preoperative examination 2024    Acute candidiasis of vulva and vagina 2024    Acute vaginitis 2024    Anemia 2024    Anxiety 2024     Localized swelling, mass and lump, left lower limb 02/23/2024    Non Hodgkin's lymphoma 02/23/2024    Other bursal cyst, unspecified site 02/23/2024    Other neuromuscular dysfunction of bladder 02/23/2024    Pain in right knee 02/23/2024    Personal history of diseases of the blood and blood-forming organs and certain disorders involving the immune mechanism 02/23/2024    Personal history of other diseases of the circulatory system 02/23/2024    Personal history of other diseases of the musculoskeletal system and connective tissue 02/23/2024    Personal history of other diseases of the respiratory system 02/23/2024    Personal history of other mental and behavioral disorders 02/23/2024    Personal history of other specified conditions 02/23/2024    Personal history of urinary (tract) infections 02/23/2024    Preglaucoma, unspecified, bilateral 02/23/2024    Presence of spectacles and contact lenses 02/23/2024    Primary open-angle glaucoma, right eye, mild stage 01/24/2024    Posterior subcapsular polar age-related cataract, bilateral 01/24/2024    Primary open angle glaucoma (POAG) of both eyes, mild stage 01/24/2024    Myopia of both eyes with astigmatism and presbyopia 01/24/2024    Other osteoporosis without current pathological fracture 01/12/2024    Wellness examination 01/12/2024    Diffuse large B-cell lymphoma of lymph nodes of multiple regions (Multi) 12/21/2023    Acute right-sided low back pain with right-sided sciatica 11/17/2023    Back pain 11/17/2023    Astigmatism, bilateral 11/17/2023    Depression with anxiety 11/17/2023    GERD without esophagitis 11/17/2023    Goiter, nodular 11/17/2023    Hand arthritis 11/17/2023    Headache 11/17/2023    Hyperlipidemia 11/17/2023    Essential (primary) hypertension 11/17/2023    Hypoestrogenism 11/17/2023    Insomnia 11/17/2023    Leg cramps 11/17/2023    Lumbar spinal stenosis 11/17/2023    Muscle spasm 11/17/2023    Osteopenia of neck of left femur  11/17/2023    Primary osteoarthritis of right knee 11/17/2023    OA (osteoarthritis) of knee 11/17/2023    Sciatica 11/17/2023    Low back pain with sciatica 11/17/2023    Urge incontinence 11/17/2023    Vitamin D deficiency 11/17/2023    Age-related osteoporosis without current pathological fracture 11/17/2023    Anemia due to chemotherapy 11/17/2023    LUIS MIGUEL (stress urinary incontinence, female) 01/04/2024        No family history on file.     Current Outpatient Medications   Medication Sig Dispense Refill    amLODIPine (Norvasc) 10 mg tablet TAKE 1/2 TABLET BY MOUTH EVERY 12 HOURS 90 tablet 1    cholecalciferol (Vitamin D-3) 25 MCG (1000 UT) capsule Take by mouth.      cranberry conc-ascorbic acid 6,000-100 mg capsule Take by mouth.      cyanocobalamin/folic acid (vitamin B12-folic acid) 500-400 mcg tablet Take by mouth.      escitalopram (Lexapro) 10 mg tablet TAKE 1 TABLET BY MOUTH EVERY DAY IN THE EVENING 90 tablet 1    gabapentin (Neurontin) 300 mg capsule Take 1 caps in morning and 3 caps at bedtime 270 capsule 0    ibuprofen 800 mg tablet Take 1 tablet (800 mg) by mouth every 8 hours if needed for mild pain (1 - 3).      lisinopril 40 mg tablet 1/2 tab q day 90 tablet 1    metoprolol succinate XL (Toprol-XL) 50 mg 24 hr tablet TAKE 1/2 TABLET (25MG) BY MOUTH EVERY 12 HOURS      omeprazole (PriLOSEC) 20 mg DR capsule TAKE 1 CAPSULE BY MOUTH EVERY DAY 90 capsule 2    brimonidine (AlphaGAN) 0.2 % ophthalmic solution Administer 1 drop into both eyes 2 times a day. (Patient not taking: Reported on 11/1/2024) 20 mL 2    tretinoin (Retin-A) 0.025 % cream Apply topically once daily at bedtime. (Patient not taking: Reported on 11/1/2024)      trospium (Sanctura XR) 60 mg 24 hour capsule Take 1 capsule (60 mg) by mouth once daily in the morning. Take before meals. (Patient not taking: Reported on 11/1/2024) 30 capsule 11    vibegron 75 mg tablet Take 1 tablet (75 mg) by mouth once daily. (Patient not taking: Reported  on 2024) 30 tablet 3     No current facility-administered medications for this visit.        Allergies   Allergen Reactions    Codeine Unknown    Hydrochlorothiazide Other    Methylprednisolone Other    Adhesive Tape-Silicones Rash     Pt. states it was tape to dress  incision, but unable to give any further information        Social History     Socioeconomic History    Marital status:    Tobacco Use    Smoking status: Never    Smokeless tobacco: Never   Substance and Sexual Activity    Alcohol use: Never    Drug use: Never   Lives w   Therapist for kids w behavior issues  No smoking alcohol or drug use     Review of Systems:  Constitutional:  Denies fever or chills, malaise, weight changes.   Eyes:  Denies change in visual acuity   HENT:  Denies nasal congestion or sore throat   Respiratory:  Denies cough or shortness of breath   Cardiovascular:  Denies chest pain or edema   GI:  Denies abdominal pain, nausea, vomiting, bloody stools or diarrhea   :  Denies dysuria   Integument:  Denies rash   Neurologic:  As per HPI  MSK: Per above HPI  Endocrine:  Denies polyuria or polydipsia   Lymphatic:  Denies swollen glands   Psychiatric:  Denies depression or anxiety            PHYSICAL EXAM:  BP (!) 167/91 (BP Location: Left arm, Patient Position: Sitting)   Pulse 81   Resp 18   LMP  (LMP Unknown)     General:  NAD, well developed,f      Psychiatric: appropriate mood & affect.   Cardiovascular:  Normal pedal pulses; no LE edema.  Respiratory:  Normal rate; unlabored breathing.  Skin:  Intact; no erythema; no ecchymosis or rash.  Lymphatic:  No lymphadenopathy or lymphedema.  NEURO:  Alert and appropriate. Speech fluent, conversing appropriately.   Motor:    Rt: HF 5/5, KE 5/5, KF 5/5, DF 5/5, EHL 5/5, PF 5/5.    Lt: HF 5/5, KE 5/5, KF 5/5, DF 5/5, EHL 5/5, PF 5/5.  UE strength 5/5  Sensation:     Light touch: decrease to LT distally    PP: impaired in the b/l L3-S1 dermatomes  Reflexes:      Decrease bl reflexes UE and LE     Babinski's downgoing b/l; no clonus  Gait: Normal, narrow based gait.     MSK:     + tinels on L at elbow  Promis 11/2025    PF21/30  Fatigue; 5/15  Social; 11/15  Pain; 0  Distress; 0           Impression: Sunni Hearn is a 74 y.o.F w pmh of  DLBCL, stage IV, IPI sp RCHOP x 6 and completed in 6/23. This was followed by  IT chemo completed x 2 9/11/23,, She presents with chemotherapy induced peripheral neuropathy that is painful at times and affects her balance and strength.       Encounter Diagnoses   Name Primary?    Diffuse large B-cell lymphoma of lymph nodes of multiple regions (Multi)     Neuropathy Yes    Ulnar neuropathy at elbow, unspecified laterality     Impaired gait and mobility        Plan:  Orders Placed This Encounter   Procedures    XR knee right 4+ views    Referral to Occupational Therapy    Referral to Physical Therapy    EMG & nerve conduction      Chemotherapy induced neuropathy, possible ulnar neuropathy  - EMG to help delineate the above  - Pt for gait balance fall prevention compensatory strategies  - OT for adls, desentization, modification of equipment etc  - might be good candidate for revive    R knee pain  - xray due to ho of replacement  - has valgus appearance w gait       Thank you for the consultation.     Jacqueline Del Toro MD  Physical Medicine and Rehabilitation

## 2024-11-04 RX ORDER — GABAPENTIN 300 MG/1
CAPSULE ORAL
Qty: 270 CAPSULE | Refills: 0 | Status: SHIPPED | OUTPATIENT
Start: 2024-11-04

## 2024-11-13 ENCOUNTER — APPOINTMENT (OUTPATIENT)
Dept: INTEGRATIVE MEDICINE | Facility: CLINIC | Age: 74
End: 2024-11-13
Payer: COMMERCIAL

## 2024-11-13 DIAGNOSIS — C83.38 DIFFUSE LARGE B-CELL LYMPHOMA OF LYMPH NODES OF MULTIPLE REGIONS (MULTI): Primary | ICD-10-CM

## 2024-11-13 DIAGNOSIS — G62.9 NEUROPATHY: ICD-10-CM

## 2024-11-13 PROCEDURE — 99214 OFFICE O/P EST MOD 30 MIN: CPT | Performed by: HOSPITALIST

## 2024-11-13 ASSESSMENT — PAIN SCALES - GENERAL: PAINLEVEL_OUTOF10: 0 - NO PAIN

## 2024-11-13 NOTE — PROGRESS NOTES
Acupuncture Visit:     Subjective   Patient ID: Sunni Hearn is a 74 y.o. female who presents for No chief complaint on file.  Seeking support for bilateral CIPN in hands and feet with worse numbness tingling in ring and pinkie finger that radiates into her own.  In feet she experiences both numbness and tingling that intermittently radiates up leg.  Symptoms worsen at days end.  Denies pain.   present.             Pre-treatment Assessment  Pain Score: 0 - No pain  Anxiety Level (0-10): 5  Stress Level (0-10): 5  Coping Level (0-10): 3  Depression Level (0-10): 0  Fatigue Level (0-10): 7  Nausea Level (0-10): 0  Wellbeing Level (0-10): 5    Review of Systems         Provider reviewed plan for the acupuncture session, precautions and contraindications. Patient/guardian/hospital staff has given consent to treat with full understanding of what to expect during the session. Before acupuncture began, provider explained to the patient to communicate at any time if the procedure was causing discomfort past their tolerance level. Patient agreed to advise acupuncturist. The acupuncturist counseled the patient on the risks of acupuncture treatment including pain, infection, bleeding, and no relief of pain. The patient was positioned comfortably. There was no evidence of infection at the site of needle insertions.    Objective   Physical Exam              Acupuncture Treatment  Patient Position: Supine on a table  Needle Guage: 40 guage /.16/ Red seirin, 42 guage /.14/ Lime green seirin  Body Points - Left: ella 5, lr 4  Body Points - Bilateral: bafeng, baxie, sp4, k 7, 10, 27, st qix1, immunex1  Needle Count In: 28  Needle Count Out: 28  Needle Retention Time (min): 25 minutes  Total Face to Face Time (min): 30 minutes         Post-treatment Assessment  0-10 (Numeric) Pain Score: 0 - No pain  Anxiety Level (0-10): 1  Stress Level (0-10): 1  Coping Level (0-10): 10  Depression Level (0-10): 1  Fatigue Level (0-10):  4  Nausea Level (0-10): 1  Wellbeing Level (0-10): 7    Assessment/Plan

## 2024-11-13 NOTE — PROGRESS NOTES
Patient ID: Sunni Hearn is a 74 y.o. female.  Referring Physician: No referring provider defined for this encounter.  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  LMP  (LMP Unknown)     PHYSICAL EXAM:  NAD, awake/alert  HEENT, NCAT, OP clear, no oral lesions  CTA bilat  RRR no mgr  Abd soft/nt/nd+bs  No c/c/e/ttp  Motor/sensory intact, CN 2-12 intact     RESULTS:  Lab Results   Component Value Date    WBC 5.6 10/04/2024    HGB 12.4 10/04/2024    HCT 38.9 10/04/2024     10/04/2024     10/04/2024    CREATININE 0.62 10/04/2024    AST 16 10/04/2024       Integrative Labs:    Functional Tests:    Assessment/Plan   73 yo woman with DLBCL, stage IV, IPI 3              RCHOP x 6 6/23 completed              IT chemo completed x 2 9/11/23     CANCER SPECIFIC RECCS:  LIFESTYLE:  Diet - 5-9 fruits/veg/day (7 veg to 2 fruits)  Cruciferous Vegetables - Brussel Sprouts, Kale, Broccoli, Cauliflower  Plant based anti-inflammatory whole foods diet  AICR New American Plate  PHYSICAL ACTIVITY 30 MIN/DAY     Reading:  [employer]  Cancer Fighting Kitchen     Websites:  Cook for your Life  [email or web address].org     SYMPTOM  MANAGEMENT:  Neuropathy     Integrative Modalities:  Acupuncture (first covered by insurance, then group acupuncture rate) - weekly for 4-6 weeks  [facility]  Rehab physician - Dr. Landers     Natural Products to Consider:     Follow up: IO 3 mo    SYMPTOM MANAGEMENT:  Integrative Oncology Symptom Management:    The [facility] Integrative Oncology Symptom Management clinic offers multi-disciplinary supervised care of cancer patients using Integrative Modalities billed to insurance using NCCN and SIO/ASCO guideline-driven practices.  ESAS is obtained prior to and after each treatment by the practitioner    Symptoms Managed:  Neuropathy - same  Seen by pj planning ot/pt    Natural Products utilized:      Integrative Treatment: Acupuncture  Session #: 1  Frequency: weekly    Referrals:   Recommendations:    Follow Up:  Symptom Management: weekly  Integrative Oncology:     I have personally seen the patient and supervised the treatment by the integrative practitioner during this visit.  Pt had symptoms discussed and I was present for the patient's 60 minutes of direct patient care.       Thank you for consulting me in for this patient  Lane Day MD

## 2024-12-12 ENCOUNTER — DOCUMENTATION (OUTPATIENT)
Dept: OCCUPATIONAL THERAPY | Facility: CLINIC | Age: 74
End: 2024-12-12
Payer: COMMERCIAL

## 2024-12-12 ENCOUNTER — DOCUMENTATION (OUTPATIENT)
Dept: PHYSICAL THERAPY | Facility: CLINIC | Age: 74
End: 2024-12-12
Payer: COMMERCIAL

## 2024-12-12 NOTE — PROGRESS NOTES
Occupational Therapy                 Therapy Communication Note    Patient Name: Sunni Hearn  MRN: [id number]  Department:   Room: Room/bed info not found  Today's Date: 12/12/2024     Discipline: Occupational Therapy      Missed Time: No Show    Comment:  No show OT evaluation

## 2024-12-13 ENCOUNTER — APPOINTMENT (OUTPATIENT)
Dept: INTEGRATIVE MEDICINE | Facility: CLINIC | Age: 74
End: 2024-12-13
Payer: COMMERCIAL

## 2024-12-18 ENCOUNTER — APPOINTMENT (OUTPATIENT)
Dept: INTEGRATIVE MEDICINE | Facility: CLINIC | Age: 74
End: 2024-12-18
Payer: COMMERCIAL

## 2024-12-19 ENCOUNTER — DOCUMENTATION (OUTPATIENT)
Dept: PHYSICAL THERAPY | Facility: CLINIC | Age: 74
End: 2024-12-19
Payer: COMMERCIAL

## 2024-12-19 NOTE — PROGRESS NOTES
Physical Therapy    Discharge Summary    Name: Sunni Hearn  MRN: [id number]  : 1950  Date: 24    Discharge Summary: PT    The patient will be discharged at this time due to inactivity. The patient has not been seen for outpatient therapy in 30+ days and has not made contact to reschedule. The patient will require new referral/evaluation to resume therapy in the future.     The patient will be discharged at this time. Please refer to initial evaluation or re-assessment for last goals/objective measures assessment and progress report.    Thank you for this referral. For any questions on this patient’s course of therapy, please call the clinic for clarification.

## 2024-12-20 ENCOUNTER — APPOINTMENT (OUTPATIENT)
Dept: INTEGRATIVE MEDICINE | Facility: CLINIC | Age: 74
End: 2024-12-20
Payer: COMMERCIAL

## 2024-12-20 DIAGNOSIS — R20.0 NUMBNESS: Primary | ICD-10-CM

## 2024-12-20 ASSESSMENT — ENCOUNTER SYMPTOMS: NUMBNESS: 1

## 2024-12-20 NOTE — PROGRESS NOTES
"Acupuncture Visit:     Subjective   Patient ID: Sunni Hearn is a 74 y.o. female who presents for Numbness  Numbness in legs, feet, arms and hands \"from chemo\"    Drops things regularly and has difficulty walking    Neuropathy          Session Information  Is this acupuncture treatment being billed to the patient's insurance company: No  Visit Type: Follow-up visit  Medical History Reviewed: I have reviewed pertinent medical history in EHR, and no contraindications are present to provide treatment         Review of Systems   Neurological:  Positive for numbness.            Provider reviewed plan for the acupuncture session, precautions and contraindications. Patient/guardian/hospital staff has given consent to treat with full understanding of what to expect during the session. Before acupuncture began, provider explained to the patient to communicate at any time if the procedure was causing discomfort past their tolerance level. Patient agreed to advise acupuncturist. The acupuncturist counseled the patient on the risks of acupuncture treatment including pain, infection, bleeding, and no relief of pain. The patient was positioned comfortably. There was no evidence of infection at the site of needle insertions.    Objective   Physical Exam              Acupuncture Treatment  Patient Position: Seated and reclining  Acupuncture Needling: Yes  Body Points: With retention  Body Points - Left: St36  Body Points - Bilateral: upper sensory 1/5, middle motor 2/5  Body Points - Right: Sp4  Auricular Points: No  Acupuncture Treatment Change: No changes  Electroacupuncture Used: No  Needle Count In: 6  Needle Count Out: 6  Needle Retention Time (min): 25 minutes  Total Face to Face Time (min): 10 minutes              Assessment/Plan       "

## 2024-12-26 ENCOUNTER — OFFICE VISIT (OUTPATIENT)
Dept: HEMATOLOGY/ONCOLOGY | Facility: HOSPITAL | Age: 74
End: 2024-12-26
Payer: COMMERCIAL

## 2024-12-26 ENCOUNTER — LAB (OUTPATIENT)
Dept: LAB | Facility: HOSPITAL | Age: 74
End: 2024-12-26
Payer: COMMERCIAL

## 2024-12-26 VITALS
DIASTOLIC BLOOD PRESSURE: 80 MMHG | RESPIRATION RATE: 16 BRPM | WEIGHT: 181.8 LBS | BODY MASS INDEX: 28.47 KG/M2 | HEART RATE: 66 BPM | SYSTOLIC BLOOD PRESSURE: 157 MMHG | TEMPERATURE: 97.5 F | OXYGEN SATURATION: 99 %

## 2024-12-26 DIAGNOSIS — K21.9 GERD WITHOUT ESOPHAGITIS: ICD-10-CM

## 2024-12-26 DIAGNOSIS — C83.38 DIFFUSE LARGE B-CELL LYMPHOMA OF LYMPH NODES OF MULTIPLE REGIONS (MULTI): ICD-10-CM

## 2024-12-26 LAB
ALBUMIN SERPL BCP-MCNC: 3.9 G/DL (ref 3.4–5)
ALP SERPL-CCNC: 51 U/L (ref 33–136)
ALT SERPL W P-5'-P-CCNC: 14 U/L (ref 7–45)
ANION GAP SERPL CALC-SCNC: 13 MMOL/L (ref 10–20)
AST SERPL W P-5'-P-CCNC: 13 U/L (ref 9–39)
BASOPHILS # BLD AUTO: 0.02 X10*3/UL (ref 0–0.1)
BASOPHILS NFR BLD AUTO: 0.4 %
BILIRUB SERPL-MCNC: 0.4 MG/DL (ref 0–1.2)
BUN SERPL-MCNC: 17 MG/DL (ref 6–23)
CALCIUM SERPL-MCNC: 9.3 MG/DL (ref 8.6–10.3)
CHLORIDE SERPL-SCNC: 109 MMOL/L (ref 98–107)
CO2 SERPL-SCNC: 25 MMOL/L (ref 21–32)
CREAT SERPL-MCNC: 0.63 MG/DL (ref 0.5–1.05)
EGFRCR SERPLBLD CKD-EPI 2021: >90 ML/MIN/1.73M*2
EOSINOPHIL # BLD AUTO: 0.2 X10*3/UL (ref 0–0.4)
EOSINOPHIL NFR BLD AUTO: 3.6 %
ERYTHROCYTE [DISTWIDTH] IN BLOOD BY AUTOMATED COUNT: 13.8 % (ref 11.5–14.5)
GLUCOSE SERPL-MCNC: 92 MG/DL (ref 74–99)
HCT VFR BLD AUTO: 38.3 % (ref 36–46)
HGB BLD-MCNC: 12.2 G/DL (ref 12–16)
IMM GRANULOCYTES # BLD AUTO: 0.04 X10*3/UL (ref 0–0.5)
IMM GRANULOCYTES NFR BLD AUTO: 0.7 % (ref 0–0.9)
LDH SERPL L TO P-CCNC: 145 U/L (ref 84–246)
LYMPHOCYTES # BLD AUTO: 1.06 X10*3/UL (ref 0.8–3)
LYMPHOCYTES NFR BLD AUTO: 19.2 %
MCH RBC QN AUTO: 28 PG (ref 26–34)
MCHC RBC AUTO-ENTMCNC: 31.9 G/DL (ref 32–36)
MCV RBC AUTO: 88 FL (ref 80–100)
MONOCYTES # BLD AUTO: 0.63 X10*3/UL (ref 0.05–0.8)
MONOCYTES NFR BLD AUTO: 11.4 %
NEUTROPHILS # BLD AUTO: 3.56 X10*3/UL (ref 1.6–5.5)
NEUTROPHILS NFR BLD AUTO: 64.7 %
NRBC BLD-RTO: 0 /100 WBCS (ref 0–0)
PLATELET # BLD AUTO: 195 X10*3/UL (ref 150–450)
POTASSIUM SERPL-SCNC: 3.7 MMOL/L (ref 3.5–5.3)
PROT SERPL-MCNC: 6.2 G/DL (ref 6.4–8.2)
RBC # BLD AUTO: 4.36 X10*6/UL (ref 4–5.2)
SODIUM SERPL-SCNC: 143 MMOL/L (ref 136–145)
WBC # BLD AUTO: 5.5 X10*3/UL (ref 4.4–11.3)

## 2024-12-26 PROCEDURE — 99214 OFFICE O/P EST MOD 30 MIN: CPT | Performed by: INTERNAL MEDICINE

## 2024-12-26 PROCEDURE — 3079F DIAST BP 80-89 MM HG: CPT | Performed by: INTERNAL MEDICINE

## 2024-12-26 PROCEDURE — 83615 LACTATE (LD) (LDH) ENZYME: CPT

## 2024-12-26 PROCEDURE — 3077F SYST BP >= 140 MM HG: CPT | Performed by: INTERNAL MEDICINE

## 2024-12-26 PROCEDURE — 1126F AMNT PAIN NOTED NONE PRSNT: CPT | Performed by: INTERNAL MEDICINE

## 2024-12-26 PROCEDURE — 36415 COLL VENOUS BLD VENIPUNCTURE: CPT

## 2024-12-26 PROCEDURE — 85025 COMPLETE CBC W/AUTO DIFF WBC: CPT

## 2024-12-26 PROCEDURE — 80053 COMPREHEN METABOLIC PANEL: CPT

## 2024-12-26 RX ORDER — OMEPRAZOLE 20 MG/1
20 CAPSULE, DELAYED RELEASE ORAL DAILY
Qty: 90 CAPSULE | Refills: 5 | Status: SHIPPED | OUTPATIENT
Start: 2024-12-26

## 2024-12-26 ASSESSMENT — PAIN SCALES - GENERAL: PAINLEVEL_OUTOF10: 0-NO PAIN

## 2024-12-26 NOTE — PROGRESS NOTES
Patient ID: Sunni Hearn is a 74 y.o. female.    Subjective    The patient has a long history of joint and back pain, and possible autoimmune disease. In November 2022, she experience pain in the right hip, which was  new. Over the next few months, it progressed to cause moderate to severe pain in the right foot, leg, thigh, and hip, most severely affecting the foot. She was given various treatments, including gabapentin, roboxin, steroid. However, none has helped.  In addition she underwent CT, MRI, and later PET/CT to work up the pain (Imani Llanes, Rajat, [place]). Notably, PET/CT on 1/19/2023 shows multiple FDG avid lymph nodes, soft tissue mass, osseous foci, and spleen. These were suspicious for malignancy,  particularly lymphoma. A biopsy was done on Jan 30, 2023,  on BONE, ILIAC, RIGHT, which was FDG avid. An initial biopsy of the bone was not diagnostic. She subsequently had another biopsy for a preverbebral  mass. Final diagnosis is DLBCL. FISH tests for BCL2 and MYC translocations are pending. Stage IV. IPI 3 (age, extranodal sites, stage).      She initiated therapy with  R-CHOP on 3/3/2023. Had significant nausea and vomiting for the first 3 days post chemo, relieved with iv Zofran. subsequently treatments were well tolerated. No  dose adjustment was needed. C6 was given on 6/16/2023.       Continues to report urinary incontinence. Was evaluated by Urology who recommended bladder mesh surgery. She has consented to this option and will have the procedure on 8/25.     She had a IT on 8/18. The procedure was not well tolerated, and she is not willing to have more. CSF results shows no evidence of lymphoma.      10/15/2024 she states she has improved in general. Numbness in the right foot and fingertips are improving without meds. Off gabapentin now. Denies fever, chills, night sweats, weight loss, headaches, visual disturbances.  12/26/2024: recently her neuropathy has worsened. She is getting  acupuncture, and resumed gabapentin, using PT and a scrambler therapy (electrical stimulation). Denies fever, chills, night sweats, weight loss, headaches, visual disturbances.       Treatment synopsis: [facility] 3/3 to 6/16/2023            Objective    BSA: 1.97 meters squared  /80 (BP Location: Left arm, Patient Position: Sitting, BP Cuff Size: Adult)   Pulse 66   Temp 36.4 °C (97.5 °F)   Resp 16   Wt 82.5 kg (181 lb 12.8 oz)   LMP  (LMP Unknown)   SpO2 99%   BMI 28.47 kg/m²      Physical Exam  Constitutional:       General: She is not in acute distress.     Appearance: She is not toxic-appearing.   HENT:      Head: Normocephalic.      Nose: Nose normal.      Mouth/Throat:      Mouth: Mucous membranes are moist.   Eyes:      Extraocular Movements: Extraocular movements intact.      Pupils: Pupils are equal, round, and reactive to light.   Cardiovascular:      Rate and Rhythm: Normal rate and regular rhythm.      Heart sounds: No murmur heard.  Pulmonary:      Effort: Pulmonary effort is normal.      Breath sounds: Normal breath sounds.   Abdominal:      General: Bowel sounds are normal.      Palpations: Abdomen is soft. There is no mass.      Tenderness: There is no abdominal tenderness. There is no rebound.   Musculoskeletal:         General: No swelling, tenderness, deformity or signs of injury.      Right lower leg: No edema.      Left lower leg: No edema.   Skin:     Coloration: Skin is not jaundiced.      Findings: No bruising, lesion or rash.   Neurological:      Mental Status: She is alert and oriented to person, place, and time.      Cranial Nerves: No cranial nerve deficit.      Motor: No weakness.      Gait: Gait normal.   Psychiatric:         Mood and Affect: Mood normal.       Performance Status:  Asymptomatic      Assessment/Plan   Newly diagnosed lymphoma      #Lymphoma   Final diagnosis is DLBCL. FISH tests for BCL2 and MYC translocations are pending.   Stage IV.   IPI 3 (age, extranodal  sites, stage).  Reviewed the results, natural history of DLBCL. stressed it is very treatable, and potentially curable in up to 70% patients.   Discussed PET/CT results. It's a CR. Very encouraging news. Will need follow up PET/CT q6mon.  -discussed results of PET done on jan 5, 2024. It confirms CR. Next PET/CT will be due on July 2024.   -Will need to repeat PET/CT in July/Aug 2024. Due to extranodal involvement, CT less informative.   -10/15/2024: discussed PET/CT of 10/4, showing CR. Labs WNL. Will continue to monitor.   12/26/2024: Lab results are WNL, no clinical sign of progression. Surprisingly sensory neuropathy in the hands has increased, likely due to stopping gabapentin. She has resumed gabapentin, and is getting scrambler therapy (electrical stimulation). Will monitor and repeat PET/CT in June 2025.     #treatment  The goal is cure. The treatment recommended is R-CHOP x 6 cycles. given q3wks. She will need GCSF and other supportive care.   AEs of drugs explained in details. In particular, the following possible AEs were discussed: severe infection, death, PML, HBV reactivation, cardiac injury, renal injury, mucositis, neuropathy (sensory, motor, autonomic), secondary tumors including leukemia.  These may happen at various frequency. will make best efforts to monitor, prevent, and treat AEs.    c1 was associated with nausea and vomiting. Pt should take zofran for 5 days at the scheduled twice a day doses, instead of the as needed dose schedule.  C2 given on 3/24/2023 was well tolerated. G1 sensory neuropathy was reported. No need to adjust vinca dose.  C3 given 4/14/23  C4 given 5/5/23 Vincristine dose reduced with C4 for grade 2 neuropathy.   C5 given 5/26/23   C6 given 6/16/23  Discussed IT therapy. Overall the AE is rare and is well tolerated. She consented and will start treatment on 8/18 and 9/11. He will need CT head, and labs before these. She received one, but is not willing to have 2nd. CSF on  8/18 was negative, with no evidence of lymphoma.     #Peripheral neuropathy  -More severe after chemo. Off gabapentin. Improving.         Plan 12/26:   -RTC 6 mon  -PET/CT in 6 mon.     Time spent > 35 min, with more than 50% on counseling and coordinating care.     Cancer Staging   No matching staging information was found for the patient.      Oncology History    No history exists.                 Celestino Rawls MD PhD

## 2024-12-27 ENCOUNTER — APPOINTMENT (OUTPATIENT)
Dept: INTEGRATIVE MEDICINE | Facility: CLINIC | Age: 74
End: 2024-12-27
Payer: COMMERCIAL

## 2024-12-27 DIAGNOSIS — R20.0 NUMBNESS: Primary | ICD-10-CM

## 2024-12-27 PROCEDURE — ACUGR GROUP ACUPUNCTURE

## 2024-12-27 NOTE — PROGRESS NOTES
Acupuncture Visit:     Subjective   Patient ID: Sunni Hearn is a 74 y.o. female who presents for Numbness  Numbness has decreased in left fingers 2&3, still numb in 4&5    Continue working on numbness in toes        Session Information  Is this acupuncture treatment being billed to the patient's insurance company: No  Visit Type: Follow-up visit  Medical History Reviewed: I have reviewed pertinent medical history in EHR, and no contraindications are present to provide treatment         Review of Systems         Provider reviewed plan for the acupuncture session, precautions and contraindications. Patient/guardian/hospital staff has given consent to treat with full understanding of what to expect during the session. Before acupuncture began, provider explained to the patient to communicate at any time if the procedure was causing discomfort past their tolerance level. Patient agreed to advise acupuncturist. The acupuncturist counseled the patient on the risks of acupuncture treatment including pain, infection, bleeding, and no relief of pain. The patient was positioned comfortably. There was no evidence of infection at the site of needle insertions.    Objective   Physical Exam              Acupuncture Treatment  Patient Position: Seated and reclining  Acupuncture Needling: Yes  Needle Guage: 40 guage /.16/ Red seirin  Body Points: With retention  Body Points - Left: Scalp Lower sensory, bafeng (4)  Body Points - Bilateral: Li4, Lr3,  Body Points - Right: Scalp upper and lower sensory  Auricular Points: No  Acupuncture Treatment Change: No changes  Electroacupuncture Used: No  Needle Count In: 9  Needle Count Out: 9  Needle Retention Time (min): 25 minutes  Total Face to Face Time (min): 10 minutes              Assessment/Plan

## 2025-01-03 ENCOUNTER — APPOINTMENT (OUTPATIENT)
Dept: INTEGRATIVE MEDICINE | Facility: CLINIC | Age: 75
End: 2025-01-03

## 2025-01-10 ENCOUNTER — APPOINTMENT (OUTPATIENT)
Dept: INTEGRATIVE MEDICINE | Facility: CLINIC | Age: 75
End: 2025-01-10

## 2025-01-10 ENCOUNTER — APPOINTMENT (OUTPATIENT)
Dept: PHYSICAL MEDICINE AND REHAB | Facility: CLINIC | Age: 75
End: 2025-01-10
Payer: COMMERCIAL

## 2025-01-10 VITALS — HEART RATE: 86 BPM | DIASTOLIC BLOOD PRESSURE: 98 MMHG | RESPIRATION RATE: 20 BRPM | SYSTOLIC BLOOD PRESSURE: 166 MMHG

## 2025-01-10 DIAGNOSIS — C83.38 DIFFUSE LARGE B-CELL LYMPHOMA OF LYMPH NODES OF MULTIPLE REGIONS (MULTI): ICD-10-CM

## 2025-01-10 DIAGNOSIS — R26.89 IMPAIRED GAIT AND MOBILITY: Primary | ICD-10-CM

## 2025-01-10 DIAGNOSIS — G62.9 NEUROPATHY: ICD-10-CM

## 2025-01-10 DIAGNOSIS — R20.0 NUMBNESS: Primary | ICD-10-CM

## 2025-01-10 PROCEDURE — 99213 OFFICE O/P EST LOW 20 MIN: CPT | Performed by: PHYSICAL MEDICINE & REHABILITATION

## 2025-01-10 PROCEDURE — 1159F MED LIST DOCD IN RCRD: CPT | Performed by: PHYSICAL MEDICINE & REHABILITATION

## 2025-01-10 PROCEDURE — 3077F SYST BP >= 140 MM HG: CPT | Performed by: PHYSICAL MEDICINE & REHABILITATION

## 2025-01-10 PROCEDURE — ACUGR GROUP ACUPUNCTURE

## 2025-01-10 PROCEDURE — 3080F DIAST BP >= 90 MM HG: CPT | Performed by: PHYSICAL MEDICINE & REHABILITATION

## 2025-01-10 PROCEDURE — 1160F RVW MEDS BY RX/DR IN RCRD: CPT | Performed by: PHYSICAL MEDICINE & REHABILITATION

## 2025-01-10 PROCEDURE — 1126F AMNT PAIN NOTED NONE PRSNT: CPT | Performed by: PHYSICAL MEDICINE & REHABILITATION

## 2025-01-10 ASSESSMENT — PAIN SCALES - GENERAL: PAINLEVEL_OUTOF10: 0-NO PAIN

## 2025-01-10 NOTE — PROGRESS NOTES
Physical Medicine and Rehabilitation [facility]  01/10/25       Chief Complaint:  Low back pain     HPI:  Sunni Hearn is a  74 y.o. F w pmh of  DLBCL, stage IV, IPI sp RCHOP x 6 and completed in 6/23. This was followed by  IT chemo completed x 2 9/11/23  Per record she was dx after presenting with hip pain,   multiple FDG avid lymph nodes, soft tissue mass, osseous foci, and spleen.    She has neuropathy in her fingertips and bottoms of her feet (not in her toes). She is having difficulty picking up small objects and dropping items. This remains unchanged.          More neuropathy is in hands and feet. Symptoms started May 2023, before the last treatment.   States she has some good and bad days. Overall feels the severity and frequencty ahs been the same.   Worse  in the hands and feet.    States left and R worse in digits 4/5 but worse in the other fingers,. Numbness is main characteristics. Sometimes numbness up to elbpw  Sometimes they will tingling but no burning.    Sometimes drops things. Fine motor movement is off. Trouble writing.    States neuropathy is worse ankle down, whole foot right and toes. Equal among the toes.  No weakness in the feet,     Had to walk straight., able to self correct.    States had pt eval but then was busy w variety of other plans.      Numbness in hand and feet is sworse at night. Does have doron horses at night.    Gabapentin 300  mg tid. Not sure if helps.      No falls. Hsa a cane prn.    No neck or back pain.  Cramping in calves before bed.      She was last seen nov 2024. At that time we discussed:  Chemotherapy induced neuropathy, possible ulnar neuropathy  - EMG to help delineate the above  - Pt for gait balance fall prevention compensatory strategies  - OT for adls, desentization, modification of equipment etc  - might be good candidate for revive    R knee pain  - xray due to ho of replacement  - has valgus appearance w gait    Acupuncture is helping the feet but not so  much the hands. Pending to schedule scrambler.  Was no show for ot and pt. Did not do R knee therapy.  R knee no pain.    Reviewed med onc and integrative onc notes  Imaging  Reviewed 10/2024   PET Ct 10/2024  FINDINGS:  HEAD AND NECK:  No evidence of focal hypermetabolic lesion in the brain parenchyma,  noting that evaluation is limited because of the expected physiologic  diffuse FDG uptake in the brain. No focal hypermetabolic soft tissue  lesion is seen in the neck. No hypermetabolic cervical  lymphadenopathy is present.      CHEST:  No focal hypermetabolic lesion is seen in the lung parenchyma.  No evidence of hypermetabolic mediastinal, hilar or axillary  lymphadenopathy.          ABDOMEN AND PELVIS:  No hypermetabolic soft tissue lesion is present in the abdomen and  pelvis. No evidence of hypermetabolic lymphadenopathy.  Physiologic radiotracer uptake is present in the liver and spleen  with excretion into the bowel loops and the genitourinary tract.          MUSCULOSKELETAL/EXTREMITIES:  No focal hypermetabolic lesion is seen in the axial or appendicular  to suggest osseous metastasis.          IMPRESSION:  No focal hypermetabolic activity throughout the body to suggest  recurrent lymphoproliferative disease. (Deauville score 1)     Past Medical History:   Diagnosis Date    Acute candidiasis of vulva and vagina     Vaginal yeast infection    Acute vaginitis 04/21/2016    Acute vaginitis    Anemia     Anxiety     COVID-19 12/01/2020    COVID-19    Dizziness     GERD (gastroesophageal reflux disease)     Hyperlipidemia     Hypertension     Localized swelling, mass and lump, left lower limb 02/15/2019    Ankle mass, left    Non Hodgkin's lymphoma     Orthostatic lightheadedness 11/17/2023    Other bursal cyst, unspecified site 03/07/2019    Synovial cyst    Other conditions influencing health status 06/06/2013    Nontoxic Goiter    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    Hypertonicity of  bladder    Pain in right knee 2015    Knee pain, right    Personal history of diseases of the blood and blood-forming organs and certain disorders involving the immune mechanism 12/10/2015    History of anemia    Personal history of other diseases of the circulatory system 2016    History of sinus tachycardia    Personal history of other diseases of the musculoskeletal system and connective tissue 2013    History of tendinitis    Personal history of other diseases of the respiratory system 2020    History of acute sinusitis    Personal history of other mental and behavioral disorders 2021    History of anxiety disorder    Personal history of other specified conditions 2016    History of bradycardia    Personal history of other specified conditions 2020    History of fatigue    Personal history of other specified conditions 12/10/2015    History of dysuria    Personal history of urinary (tract) infections 2018    History of urinary tract infection    Preglaucoma, unspecified, bilateral 2015    Glaucoma suspect, both eyes    Presence of spectacles and contact lenses 2015    Uses contact lenses    Primary open-angle glaucoma, bilateral, mild stage 10/12/2015    Primary open angle glaucoma of both eyes, mild stage    Primary open-angle glaucoma, bilateral, mild stage 10/12/2015    Primary open angle glaucoma of both eyes, mild stage    Primary open-angle glaucoma, bilateral, mild stage 10/12/2015    Primary open angle glaucoma of both eyes, mild stage    Primary open-angle glaucoma, bilateral, mild stage 10/13/2015    Primary open angle glaucoma of both eyes, mild stage    Primary open-angle glaucoma, unspecified eye, stage unspecified 10/13/2015    POAG (primary open-angle glaucoma)    Right lower quadrant pain 2015    Abdominal pain, right lower quadrant        Past Surgical History:   Procedure Laterality Date     SECTION, CLASSIC  2013      Section    CT GUIDED PERCUTANEOUS BIOPSY BONE DEEP  2023    CT GUIDED PERCUTANEOUS BIOPSY BONE DEEP 2023 DOCTOR OFFICE LEGAC[facility]    CT GUIDED PERCUTANEOUS BIOPSY BONE DEEP  2/10/2023    CT GUIDED PERCUTANEOUS BIOPSY BONE DEEP 2/10/2023 DOCTOR OFFICE LEGACY    OTHER SURGICAL HISTORY  2021    Knee replacement        Patient Active Problem List    Diagnosis Date Noted    Balance disorder 07/10/2024    Preoperative examination 2024    Acute candidiasis of vulva and vagina 2024    Acute vaginitis 2024    Anemia 2024    Anxiety 2024    Localized swelling, mass and lump, left lower limb 2024    Non Hodgkin's lymphoma 2024    Other bursal cyst, unspecified site 2024    Other neuromuscular dysfunction of bladder 2024    Pain in right knee 2024    Personal history of diseases of the blood and blood-forming organs and certain disorders involving the immune mechanism 2024    Personal history of other diseases of the circulatory system 2024    Personal history of other diseases of the musculoskeletal system and connective tissue 2024    Personal history of other diseases of the respiratory system 2024    Personal history of other mental and behavioral disorders 2024    Personal history of other specified conditions 2024    Personal history of urinary (tract) infections 2024    Preglaucoma, unspecified, bilateral 2024    Presence of spectacles and contact lenses 2024    Primary open-angle glaucoma, right eye, mild stage 2024    Posterior subcapsular polar age-related cataract, bilateral 2024    Primary open angle glaucoma (POAG) of both eyes, mild stage 2024    Myopia of both eyes with astigmatism and presbyopia 2024    Other osteoporosis without current pathological fracture 2024    Wellness examination 2024    Diffuse large B-cell lymphoma of lymph nodes  of multiple regions (Multi) 12/21/2023    Acute right-sided low back pain with right-sided sciatica 11/17/2023    Back pain 11/17/2023    Astigmatism, bilateral 11/17/2023    Depression with anxiety 11/17/2023    GERD without esophagitis 11/17/2023    Goiter, nodular 11/17/2023    Hand arthritis 11/17/2023    Headache 11/17/2023    Hyperlipidemia 11/17/2023    Essential (primary) hypertension 11/17/2023    Hypoestrogenism 11/17/2023    Insomnia 11/17/2023    Leg cramps 11/17/2023    Lumbar spinal stenosis 11/17/2023    Muscle spasm 11/17/2023    Osteopenia of neck of left femur 11/17/2023    Primary osteoarthritis of right knee 11/17/2023    OA (osteoarthritis) of knee 11/17/2023    Sciatica 11/17/2023    Low back pain with sciatica 11/17/2023    Urge incontinence 11/17/2023    Vitamin D deficiency 11/17/2023    Age-related osteoporosis without current pathological fracture 11/17/2023    Anemia due to chemotherapy 11/17/2023    LUIS MIGUEL (stress urinary incontinence, female) 01/04/2024        No family history on file.     Current Outpatient Medications   Medication Sig Dispense Refill    amLODIPine (Norvasc) 10 mg tablet TAKE 1/2 TABLET BY MOUTH EVERY 12 HOURS 90 tablet 1    cholecalciferol (Vitamin D-3) 25 MCG (1000 UT) capsule Take by mouth.      cranberry conc-ascorbic acid 6,000-100 mg capsule Take by mouth.      cyanocobalamin/folic acid (vitamin B12-folic acid) 500-400 mcg tablet Take by mouth.      escitalopram (Lexapro) 10 mg tablet TAKE 1 TABLET BY MOUTH EVERY DAY IN THE EVENING 90 tablet 1    gabapentin (Neurontin) 300 mg capsule TAKE 1 CAPS IN MORNING AND 3 CAPS AT BEDTIME 270 capsule 0    ibuprofen 800 mg tablet Take 1 tablet (800 mg) by mouth every 8 hours if needed for mild pain (1 - 3).      lisinopril 40 mg tablet 1/2 tab q day 90 tablet 1    metoprolol succinate XL (Toprol-XL) 50 mg 24 hr tablet TAKE 1/2 TABLET (25MG) BY MOUTH EVERY 12 HOURS      omeprazole (PriLOSEC) 20 mg DR capsule Take 1 capsule (20  mg) by mouth once daily. 90 capsule 5    trospium (Sanctura XR) 60 mg 24 hour capsule Take 1 capsule (60 mg) by mouth once daily in the morning. Take before meals. (Patient not taking: Reported on 1/10/2025) 30 capsule 11     No current facility-administered medications for this visit.        Allergies   Allergen Reactions    Codeine Unknown    Hydrochlorothiazide Other    Methylprednisolone Other    Adhesive Tape-Silicones Rash     Pt. states it was tape to dress  incision, but unable to give any further information        Social History     Socioeconomic History    Marital status:    Tobacco Use    Smoking status: Never    Smokeless tobacco: Never   Substance and Sexual Activity    Alcohol use: Never    Drug use: Never   Lives w   Therapist for kids w behavior issues  No smoking alcohol or drug use     Review of Systems:  Constitutional:  Denies fever or chills, malaise, weight changes.   Eyes:  Denies change in visual acuity   HENT:  Denies nasal congestion or sore throat   Respiratory:  Denies cough or shortness of breath   Cardiovascular:  Denies chest pain or edema   GI:  Denies abdominal pain, nausea, vomiting, bloody stools or diarrhea   :  Denies dysuria   Integument:  Denies rash   Neurologic:  As per HPI  MSK: Per above HPI  Endocrine:  Denies polyuria or polydipsia   Lymphatic:  Denies swollen glands   Psychiatric:  Denies depression or anxiety            PHYSICAL EXAM:  BP (!) 166/98 (BP Location: Left arm, Patient Position: Standing) Comment: forgot to take medication today  Pulse 86   Resp 20   LMP  (LMP Unknown)     General:  NAD, well developed,f      Psychiatric: appropriate mood & affect.   Cardiovascular:  Normal pedal pulses; no LE edema.  Respiratory:  Normal rate; unlabored breathing.  Skin:  Intact; no erythema; no ecchymosis or rash.  Lymphatic:  No lymphadenopathy or lymphedema.  NEURO:  Alert and appropriate. Speech fluent, conversing appropriately.   Motor:     Rt: HF 5/5, KE 5/5, KF 5/5, DF 5/5, EHL 5/5, PF 5/5.    Lt: HF 5/5, KE 5/5, KF 5/5, DF 5/5, EHL 5/5, PF 5/5.  UE strength 5/5  Sensation:     Light touch: decrease to LT distally    PP: impaired in the b/l L3-S1 dermatomes  Reflexes:     Decrease bl reflexes UE and LE     Babinski's downgoing b/l; no clonus  Gait: Normal, narrow based gait.     MSK:     + tinels on L at elbow  Promis 11/2025    PF21/30  Fatigue; 5/15  Social; 11/15  Pain; 0  Distress; 0           Impression: Sunni Hearn is a 74 y.o.F w pmh of  DLBCL, stage IV, IPI sp RCHOP x 6 and completed in 6/23. This was followed by  IT chemo completed x 2 9/11/23,, She presents with chemotherapy induced peripheral neuropathy that is painful at times and affects her balance and strength.       Plan:      Chemotherapy induced neuropathy, possible ulnar neuropathy  - EMG to help delineate the above  - Pt for gait balance fall prevention compensatory strategies  - OT for adls, desentization, modification of equipment etc  - might be good candidate for revive; gave info but far for her  - continue acupuncture it is helping  - pending scrambler eval    R knee pain  - xray due to ho of replacement; encouraged to complete  - pain improved, will hold off on pt  - has valgus appearance w gait     Fu 8 weeks     Jacqueline Del Toro MD  Physical Medicine and Rehabilitation

## 2025-01-10 NOTE — PROGRESS NOTES
Acupuncture Visit:     Subjective   Patient ID: Sunni Hearn is a 74 y.o. female who presents for Numbness  Stability, walking and numbness in feet has improved  Would like to continue working on both    Numbness slowly improving in left finger tips        Session Information  Is this acupuncture treatment being billed to the patient's insurance company: No  Visit Type: Follow-up visit  Medical History Reviewed: I have reviewed pertinent medical history in EHR, and no contraindications are present to provide treatment         Review of Systems         Provider reviewed plan for the acupuncture session, precautions and contraindications. Patient/guardian/hospital staff has given consent to treat with full understanding of what to expect during the session. Before acupuncture began, provider explained to the patient to communicate at any time if the procedure was causing discomfort past their tolerance level. Patient agreed to advise acupuncturist. The acupuncturist counseled the patient on the risks of acupuncture treatment including pain, infection, bleeding, and no relief of pain. The patient was positioned comfortably. There was no evidence of infection at the site of needle insertions.    Objective   Physical Exam              Acupuncture Treatment  Patient Position: Seated and reclining  Acupuncture Needling: Yes  Needle Guage: 32 guage /.25/ Purple seirin, 40 guage /.16/ Red seirin  Body Points: With retention  Body Points - Left: Sp6  Body Points - Bilateral: Li4, Lv3  Body Points - Right: Scalp upper and mid 1/5, 2/5  Auricular Points: No  Acupuncture Treatment Change: No changes  Electroacupuncture Used: No  Needle Count In: 7  Needle Count Out: 7  Needle Retention Time (min): 25 minutes  Total Face to Face Time (min): 15 minutes              Assessment/Plan

## 2025-01-15 DIAGNOSIS — I10 ESSENTIAL (PRIMARY) HYPERTENSION: ICD-10-CM

## 2025-01-15 RX ORDER — LISINOPRIL 40 MG/1
TABLET ORAL
Qty: 45 TABLET | Refills: 1 | Status: SHIPPED | OUTPATIENT
Start: 2025-01-15

## 2025-01-16 ENCOUNTER — APPOINTMENT (OUTPATIENT)
Dept: PRIMARY CARE | Facility: CLINIC | Age: 75
End: 2025-01-16
Payer: COMMERCIAL

## 2025-01-16 VITALS
SYSTOLIC BLOOD PRESSURE: 132 MMHG | HEIGHT: 67 IN | BODY MASS INDEX: 28.3 KG/M2 | DIASTOLIC BLOOD PRESSURE: 60 MMHG | RESPIRATION RATE: 16 BRPM | HEART RATE: 58 BPM | WEIGHT: 180.34 LBS | OXYGEN SATURATION: 95 %

## 2025-01-16 DIAGNOSIS — Z12.31 VISIT FOR SCREENING MAMMOGRAM: ICD-10-CM

## 2025-01-16 DIAGNOSIS — E78.00 PURE HYPERCHOLESTEROLEMIA: ICD-10-CM

## 2025-01-16 DIAGNOSIS — F41.8 DEPRESSION WITH ANXIETY: ICD-10-CM

## 2025-01-16 DIAGNOSIS — K21.9 GERD WITHOUT ESOPHAGITIS: ICD-10-CM

## 2025-01-16 DIAGNOSIS — I10 ESSENTIAL (PRIMARY) HYPERTENSION: Primary | ICD-10-CM

## 2025-01-16 PROBLEM — H25.043 POSTERIOR SUBCAPSULAR POLAR AGE-RELATED CATARACT, BILATERAL: Status: RESOLVED | Noted: 2024-01-24 | Resolved: 2025-01-16

## 2025-01-16 PROBLEM — T45.1X5A ANEMIA DUE TO CHEMOTHERAPY: Status: RESOLVED | Noted: 2023-11-17 | Resolved: 2025-01-16

## 2025-01-16 PROBLEM — G62.0 CHEMOTHERAPY-INDUCED PERIPHERAL NEUROPATHY (MULTI): Status: ACTIVE | Noted: 2025-01-16

## 2025-01-16 PROBLEM — Z01.818 PREOPERATIVE EXAMINATION: Status: RESOLVED | Noted: 2024-07-03 | Resolved: 2025-01-16

## 2025-01-16 PROBLEM — T45.1X5A CHEMOTHERAPY-INDUCED PERIPHERAL NEUROPATHY (MULTI): Status: ACTIVE | Noted: 2025-01-16

## 2025-01-16 PROBLEM — D64.81 ANEMIA DUE TO CHEMOTHERAPY: Status: RESOLVED | Noted: 2023-11-17 | Resolved: 2025-01-16

## 2025-01-16 PROCEDURE — 3075F SYST BP GE 130 - 139MM HG: CPT | Performed by: INTERNAL MEDICINE

## 2025-01-16 PROCEDURE — 1159F MED LIST DOCD IN RCRD: CPT | Performed by: INTERNAL MEDICINE

## 2025-01-16 PROCEDURE — 3078F DIAST BP <80 MM HG: CPT | Performed by: INTERNAL MEDICINE

## 2025-01-16 PROCEDURE — 1036F TOBACCO NON-USER: CPT | Performed by: INTERNAL MEDICINE

## 2025-01-16 PROCEDURE — 99214 OFFICE O/P EST MOD 30 MIN: CPT | Performed by: INTERNAL MEDICINE

## 2025-01-16 ASSESSMENT — ENCOUNTER SYMPTOMS
PALPITATIONS: 0
HEADACHES: 0
DEPRESSION: 0
LIGHT-HEADEDNESS: 0
LOSS OF SENSATION IN FEET: 0
SLEEP DISTURBANCE: 0
SHORTNESS OF BREATH: 0
NERVOUS/ANXIOUS: 0
OCCASIONAL FEELINGS OF UNSTEADINESS: 0

## 2025-01-16 ASSESSMENT — COLUMBIA-SUICIDE SEVERITY RATING SCALE - C-SSRS
2. HAVE YOU ACTUALLY HAD ANY THOUGHTS OF KILLING YOURSELF?: NO
1. IN THE PAST MONTH, HAVE YOU WISHED YOU WERE DEAD OR WISHED YOU COULD GO TO SLEEP AND NOT WAKE UP?: NO
6. HAVE YOU EVER DONE ANYTHING, STARTED TO DO ANYTHING, OR PREPARED TO DO ANYTHING TO END YOUR LIFE?: NO

## 2025-01-16 NOTE — PATIENT INSTRUCTIONS
Work on some kind of exercise during winter, avoid being barefoot at any time, continue current medications, return in 4 months.

## 2025-01-16 NOTE — PROGRESS NOTES
"Subjective   Patient ID: Sunni Hearn is a 74 y.o. female who presents for Hypertension., mood, gerd.    HPI Her diet is moderate in salt. During cold months she is sedentary.   Hearburn became symptomatic after she stopped for over 3 weeks her PPI. She re started recently and symptoms are resolved. 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 She is also taking gabapentin 900 mg at bedtime and 300 mg q am.  She continues seing urologist after sling in 2023 and 2024, currently using trospium with acceptable control of urine urgency, except in early morning.   She also had cataracts surgery.    Review of Systems   Respiratory:  Negative for shortness of breath.    Cardiovascular:  Negative for chest pain and palpitations.   Neurological:  Negative for light-headedness and headaches.   Psychiatric/Behavioral:  Negative for behavioral problems and sleep disturbance. The patient is not nervous/anxious.    All other systems reviewed and are negative.      Objective   /60   Pulse 58   Resp 16   Ht 1.702 m (5' 7\")   Wt 81.8 kg (180 lb 5.4 oz)   LMP  (LMP Unknown)   SpO2 95%   BMI 28.24 kg/m²     Physical Exam  Eyes - conjunctivae clear, PERRLA, she has natural hair back  HEENT - wearing hearing aids  Neck - no cervical lymphadenopathy,  thyromegaly  Axilla - no palpable lymphadenopathy  Cardiac- regular rate and rhythm, FATOU 2/6 murmur, no carotid bruit, no JVP  Lung - clear to auscultation, no rales, no rhonchi, no wheezing  GI - normally active bowel sounds, non tender, non distended, no hepatosplenomegaly, no rebound  MSK - non deformities  Extremities - no edema, good distal pulses  Neuro - decreased propioception and superficial sensorial on bilateral distal extermities and hands,  oriented x 3  Skin - no bruises, no rashes  Psychiatric - pleasant, well groom, no " hallucinations    Assessment/Plan   Problem List Items Addressed This Visit             ICD-10-CM    Depression with anxiety F41.8     Stable in current meds         GERD without esophagitis K21.9     Discussed use of PPI long term, she will contact us if needs refills.         Hyperlipidemia E78.5     On goal continue          Essential (primary) hypertension - Primary I10     Discussed salt intake, exercise, no changes in meds.          Other Visit Diagnoses         Codes    Visit for screening mammogram     Z12.31    Relevant Orders    BI mammo bilateral screening tomosynthesis

## 2025-01-24 ENCOUNTER — APPOINTMENT (OUTPATIENT)
Dept: INTEGRATIVE MEDICINE | Facility: CLINIC | Age: 75
End: 2025-01-24

## 2025-01-24 DIAGNOSIS — R20.0 NUMBNESS: Primary | ICD-10-CM

## 2025-01-24 PROCEDURE — ACUGR GROUP ACUPUNCTURE

## 2025-01-24 NOTE — PROGRESS NOTES
Acupuncture Visit:     Subjective   Patient ID: Sunni Hearn is a 74 y.o. female who presents for Numbness  Numbness improving  Worse in left hand fingers 4&5    Able to walk better        Session Information  Is this acupuncture treatment being billed to the patient's insurance company: No  Visit Type: Follow-up visit  Medical History Reviewed: I have reviewed pertinent medical history in EHR, and no contraindications are present to provide treatment         Review of Systems         Provider reviewed plan for the acupuncture session, precautions and contraindications. Patient/guardian/hospital staff has given consent to treat with full understanding of what to expect during the session. Before acupuncture began, provider explained to the patient to communicate at any time if the procedure was causing discomfort past their tolerance level. Patient agreed to advise acupuncturist. The acupuncturist counseled the patient on the risks of acupuncture treatment including pain, infection, bleeding, and no relief of pain. The patient was positioned comfortably. There was no evidence of infection at the site of needle insertions.    Objective   Physical Exam              Acupuncture Treatment  Patient Position: Seated and reclining  Acupuncture Needling: Yes  Needle Guage: 40 guage /.16/ Red seirin, 32 guage /.25/ Purple seirin  Body Points: With retention  Body Points - Left: St40, baxie digits 4&5  Body Points - Bilateral: Upper scalp 1/5 sensory  Body Points - Right: Upper 2/5 sensory  Auricular Points: No  Acupuncture Treatment Change: No changes  Electroacupuncture Used: No  Needle Count In: 6  Needle Count Out: 6  Needle Retention Time (min): 25 minutes  Total Face to Face Time (min): 10 minutes              Assessment/Plan

## 2025-01-31 ENCOUNTER — APPOINTMENT (OUTPATIENT)
Dept: INTEGRATIVE MEDICINE | Facility: CLINIC | Age: 75
End: 2025-01-31
Payer: COMMERCIAL

## 2025-01-31 DIAGNOSIS — R20.0 NUMBNESS: Primary | ICD-10-CM

## 2025-01-31 PROCEDURE — ACUGR GROUP ACUPUNCTURE

## 2025-02-05 ENCOUNTER — HOSPITAL ENCOUNTER (OUTPATIENT)
Dept: RADIOLOGY | Facility: CLINIC | Age: 75
Discharge: HOME | End: 2025-02-05
Payer: COMMERCIAL

## 2025-02-05 VITALS — HEIGHT: 67 IN | BODY MASS INDEX: 28.3 KG/M2 | WEIGHT: 180.34 LBS

## 2025-02-05 DIAGNOSIS — Z12.31 VISIT FOR SCREENING MAMMOGRAM: ICD-10-CM

## 2025-02-05 PROCEDURE — 77067 SCR MAMMO BI INCL CAD: CPT

## 2025-02-05 PROCEDURE — 77063 BREAST TOMOSYNTHESIS BI: CPT | Performed by: RADIOLOGY

## 2025-02-05 PROCEDURE — 77067 SCR MAMMO BI INCL CAD: CPT | Performed by: RADIOLOGY

## 2025-02-07 ENCOUNTER — APPOINTMENT (OUTPATIENT)
Dept: INTEGRATIVE MEDICINE | Facility: CLINIC | Age: 75
End: 2025-02-07
Payer: COMMERCIAL

## 2025-02-07 DIAGNOSIS — R20.0 NUMBNESS: Primary | ICD-10-CM

## 2025-02-07 PROCEDURE — ACUGR GROUP ACUPUNCTURE

## 2025-02-07 NOTE — PROGRESS NOTES
Group Acupuncture Visit:     Subjective   Patient ID: Sunni Hearn is a 74 y.o. female who presents for Numbness  Numbness in fingers 4&5    Knee and walking improved.        Session Information  Is this acupuncture treatment being billed to the patient's insurance company: No  Visit Type: Follow-up visit  Medical History Reviewed: I have reviewed pertinent medical history in EHR, and no contraindications are present to provide treatment         Review of Systems         Provider reviewed plan for the acupuncture session, precautions and contraindications. Patient/guardian/hospital staff has given consent to treat with full understanding of what to expect during the session. Before acupuncture began, provider explained to the patient to communicate at any time if the procedure was causing discomfort past their tolerance level. Patient agreed to advise acupuncturist. The acupuncturist counseled the patient on the risks of acupuncture treatment including pain, infection, bleeding, and no relief of pain. The patient was positioned comfortably. There was no evidence of infection at the site of needle insertions.    Objective   Physical Exam              Acupuncture Treatment  Patient Position: Seated and reclining  Acupuncture Needling: Yes  Needle Guage: 32 guage /.25/ Purple seirin, 40 guage /.16/ Red seirin  Body Points - Left: St36  Body Points - Bilateral: Scalp sensory 2/5, Baxie digits 4&5  Body Points - Right: Sp9  Auricular Points: No  Acupuncture Treatment Change: No changes  Electroacupuncture Used: No  Needle Count In: 6  Needle Count Out: 6  Needle Retention Time (min): 25 minutes  Total Face to Face Time (min): 10 minutes              Assessment/Plan

## 2025-02-27 ENCOUNTER — APPOINTMENT (OUTPATIENT)
Dept: UROLOGY | Facility: CLINIC | Age: 75
End: 2025-02-27
Payer: COMMERCIAL

## 2025-02-27 DIAGNOSIS — N32.81 OAB (OVERACTIVE BLADDER): Primary | ICD-10-CM

## 2025-02-27 DIAGNOSIS — G62.9 NEUROPATHY: ICD-10-CM

## 2025-02-27 DIAGNOSIS — N39.3 SUI (STRESS URINARY INCONTINENCE, FEMALE): ICD-10-CM

## 2025-02-27 PROCEDURE — 99214 OFFICE O/P EST MOD 30 MIN: CPT | Performed by: STUDENT IN AN ORGANIZED HEALTH CARE EDUCATION/TRAINING PROGRAM

## 2025-02-27 RX ORDER — TROSPIUM CHLORIDE ER 60 MG/1
60 CAPSULE ORAL
Qty: 30 CAPSULE | Refills: 11 | Status: SHIPPED | OUTPATIENT
Start: 2025-02-27 | End: 2026-02-27

## 2025-02-27 NOTE — PROGRESS NOTES
Virtual or Telephone Consent    An interactive audio and video telecommunication system which permits real time communications between the patient (at the originating site) and provider (at the distant site) was utilized to provide this telehealth service.   Verbal consent was requested and obtained from Sunni Hearn on this date, 02/27/25 for a telehealth visit and the patient's location was confirmed at the time of the visit.    HISTORY OF PRESENT ILLNESS:  Sunni Hearn is a 74 y.o. female who presents today for a virtual follow up visit. She is waking up wet at night. That is the only time she has an issue with incontinence. She is taking her medication. She has not recently changed any medications.           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), Pain in right knee (12/23/2015), Personal history of diseases of the blood and blood-forming organs and certain disorders involving the immune mechanism (12/10/2015), Personal history of other diseases of the circulatory system (04/07/2016), Personal history of other diseases of the musculoskeletal system and connective tissue (06/20/2013), Personal history of other diseases of the respiratory system (11/18/2020), Personal history of other mental and behavioral disorders (01/14/2021), Personal history of other specified conditions (04/07/2016), Personal history of other specified conditions (12/01/2020),  Personal history of other specified conditions (12/10/2015), Personal history of urinary (tract) infections (2018), Posterior subcapsular polar age-related cataract, bilateral (2024), Preglaucoma, unspecified, bilateral (2015), Preoperative examination (2024), Presence of spectacles and contact lenses (2015), Primary open-angle glaucoma, bilateral, mild stage (10/12/2015), Primary open-angle glaucoma, bilateral, mild stage (10/12/2015), Primary open-angle glaucoma, bilateral, mild stage (10/12/2015), Primary open-angle glaucoma, bilateral, mild stage (10/13/2015), Primary open-angle glaucoma, unspecified eye, stage unspecified (10/13/2015), and Right lower quadrant pain (2015).    Surgical History  She has a past surgical history that includes  section, classic (2013); Other surgical history (2021); CT guided percutaneous biopsy bone deep (2023); and CT guided percutaneous biopsy bone deep (2/10/2023).     Social History  She reports that she has never smoked. She has never used smokeless tobacco. She reports that she does not drink alcohol and does not use drugs.    Family History  Family History   Problem Relation Name Age of Onset    Ovarian cancer Mother          Allergies  Codeine, Hydrochlorothiazide, Methylprednisolone, and Adhesive tape-silicones      A comprehensive 10+ review of systems was negative except for: see hpi                  Assessment:  Sunni Hearn is a 74 y.o. with mixed incontinence, positive CST     CARISA  -failed botox 100 units, detrol, myrbetriq (STM)  -UDS shows em, normal emptying   -S/p sling , did not have complete resolution of her stress symptoms, Repeat UDS, show showed persistent stress incontinence,  -S/P sling 2024,   -Rx trospium 60 mg and gemtesa and doing well            Neuropathy:  On gabapentin     All questions and concerns were answered and addressed.  The patient expressed understanding and agrees  with the plan.     Stefan Jolly MD    Scribe Attestation  By signing my name below, I, Lily Merritt, [employer]   attest that this documentation has been prepared under the direction and in the presence of Stefan Jolly MD.

## 2025-02-28 ENCOUNTER — APPOINTMENT (OUTPATIENT)
Dept: INTEGRATIVE MEDICINE | Facility: CLINIC | Age: 75
End: 2025-02-28

## 2025-02-28 DIAGNOSIS — R20.0 NUMBNESS: Primary | ICD-10-CM

## 2025-02-28 PROCEDURE — ACUGR GROUP ACUPUNCTURE

## 2025-02-28 NOTE — PROGRESS NOTES
Group Acupuncture Visit:     Subjective   Patient ID: Sunni Hearn is a 74 y.o. female who presents for Numbness  Continue to work on numbness in finger tips    Feet feeling better, walking improved also      Session Information  Is this acupuncture treatment being billed to the patient's insurance company: No  Visit Type: Follow-up visit  Medical History Reviewed: I have reviewed pertinent medical history in EHR, and no contraindications are present to provide treatment         Review of Systems         Provider reviewed plan for the acupuncture session, precautions and contraindications. Patient/guardian/hospital staff has given consent to treat with full understanding of what to expect during the session. Before acupuncture began, provider explained to the patient to communicate at any time if the procedure was causing discomfort past their tolerance level. Patient agreed to advise acupuncturist. The acupuncturist counseled the patient on the risks of acupuncture treatment including pain, infection, bleeding, and no relief of pain. The patient was positioned comfortably. There was no evidence of infection at the site of needle insertions.    Objective   Physical Exam              Acupuncture Treatment  Patient Position: Seated and reclining  Acupuncture Needling: Yes  Needle Guage: 40 guage /.16/ Red seirin  Body Points: With retention  Body Points - Left: Gb40  Body Points - Bilateral: Scalp middle sensory, baxie digits 1, 2, & 3  Body Points - Right: Sp4  Auricular Points: No  Acupuncture Treatment Change: No changes  Electroacupuncture Used: No  Needle Count In: 10  Needle Count Out: 10  Needle Retention Time (min): 25 minutes  Total Face to Face Time (min): 10 minutes              Assessment/Plan

## 2025-03-07 ENCOUNTER — APPOINTMENT (OUTPATIENT)
Dept: INTEGRATIVE MEDICINE | Facility: CLINIC | Age: 75
End: 2025-03-07

## 2025-03-07 DIAGNOSIS — R20.0 NUMBNESS: Primary | ICD-10-CM

## 2025-03-07 PROCEDURE — ACUGR GROUP ACUPUNCTURE

## 2025-03-07 NOTE — PROGRESS NOTES
Group Acupuncture Visit:     Subjective   Patient ID: Sunni Hearn is a 74 y.o. female who presents for Numbness  Continue working on numbness in finger tips  Able to use a curling iron again    Stability with walking is improving        Session Information  Is this acupuncture treatment being billed to the patient's insurance company: No  Visit Type: Follow-up visit  Medical History Reviewed: I have reviewed pertinent medical history in EHR, and no contraindications are present to provide treatment         Review of Systems         Provider reviewed plan for the acupuncture session, precautions and contraindications. Patient/guardian/hospital staff has given consent to treat with full understanding of what to expect during the session. Before acupuncture began, provider explained to the patient to communicate at any time if the procedure was causing discomfort past their tolerance level. Patient agreed to advise acupuncturist. The acupuncturist counseled the patient on the risks of acupuncture treatment including pain, infection, bleeding, and no relief of pain. The patient was positioned comfortably. There was no evidence of infection at the site of needle insertions.    Objective   Physical Exam              Acupuncture Treatment  Patient Position: Prone on a table  Acupuncture Needling: No  Needle Guage: 32 guage /.25/ Purple seirin, 40 guage /.16/ Red seirin  Body Points: With retention  Body Points - Left: Bl60, Lv3  Body Points - Bilateral: Scalp middle sensory, baxie digits 1, 2, & 3  Body Points - Right: Ht9, Sj5  Auricular Points: No  Acupuncture Treatment Change: No changes  Electroacupuncture Used: No  Needle Count In: 12  Needle Count Out: 12  Needle Retention Time (min): 25 minutes  Total Face to Face Time (min): 10 minutes              Assessment/Plan

## 2025-03-21 ENCOUNTER — APPOINTMENT (OUTPATIENT)
Dept: PHYSICAL MEDICINE AND REHAB | Facility: CLINIC | Age: 75
End: 2025-03-21
Payer: COMMERCIAL

## 2025-04-04 ENCOUNTER — APPOINTMENT (OUTPATIENT)
Dept: INTEGRATIVE MEDICINE | Facility: CLINIC | Age: 75
End: 2025-04-04

## 2025-04-04 DIAGNOSIS — R20.0 NUMBNESS: Primary | ICD-10-CM

## 2025-04-04 PROCEDURE — ACUGR GROUP ACUPUNCTURE

## 2025-04-04 NOTE — PROGRESS NOTES
Group Acupuncture Visit:     Subjective   Patient ID: Sunni Hearn is a 74 y.o. female who presents for Numbness  Improvements in numbness of right hand  Left had numbness continues in figures and going proximal to elbow    Walking and stability continues to improves        Session Information  Is this acupuncture treatment being billed to the patient's insurance company: No  Visit Type: Follow-up visit         Review of Systems         Provider reviewed plan for the acupuncture session, precautions and contraindications. Patient/guardian/hospital staff has given consent to treat with full understanding of what to expect during the session. Before acupuncture began, provider explained to the patient to communicate at any time if the procedure was causing discomfort past their tolerance level. Patient agreed to advise acupuncturist. The acupuncturist counseled the patient on the risks of acupuncture treatment including pain, infection, bleeding, and no relief of pain. The patient was positioned comfortably. There was no evidence of infection at the site of needle insertions.    Objective   Physical Exam              Acupuncture Treatment  Needle Guage: 32 guage /.25/ Purple seirin, 40 guage /.16/ Red seirin  Body Points: With retention  Body Points - Left: St36, baxie (digits 1,2,3)  Body Points - Bilateral: Scalp mid 2/5  Body Points - Right: Sp4, Lu5  Auricular Points: No  Acupuncture Treatment Change: No changes  Electroacupuncture Used: No  Needle Count In: 8  Needle Count Out: 8  Needle Retention Time (min): 25 minutes  Total Face to Face Time (min): 5 minutes              Assessment/Plan

## 2025-04-10 DIAGNOSIS — G89.29 CHRONIC BILATERAL LOW BACK PAIN WITHOUT SCIATICA: ICD-10-CM

## 2025-04-10 DIAGNOSIS — M54.50 CHRONIC BILATERAL LOW BACK PAIN WITHOUT SCIATICA: ICD-10-CM

## 2025-04-10 RX ORDER — GABAPENTIN 300 MG/1
CAPSULE ORAL
Qty: 270 CAPSULE | Refills: 1 | Status: SHIPPED | OUTPATIENT
Start: 2025-04-10

## 2025-04-11 ENCOUNTER — APPOINTMENT (OUTPATIENT)
Dept: INTEGRATIVE MEDICINE | Facility: CLINIC | Age: 75
End: 2025-04-11

## 2025-05-12 DIAGNOSIS — I10 ESSENTIAL (PRIMARY) HYPERTENSION: ICD-10-CM

## 2025-05-12 RX ORDER — LISINOPRIL 40 MG/1
20 TABLET ORAL DAILY
Qty: 45 TABLET | Refills: 0 | Status: SHIPPED | OUTPATIENT
Start: 2025-05-12

## 2025-05-12 RX ORDER — AMLODIPINE BESYLATE 10 MG/1
TABLET ORAL EVERY 12 HOURS
Qty: 90 TABLET | Refills: 0 | Status: SHIPPED | OUTPATIENT
Start: 2025-05-12

## 2025-05-14 ENCOUNTER — APPOINTMENT (OUTPATIENT)
Dept: PRIMARY CARE | Facility: CLINIC | Age: 75
End: 2025-05-14
Payer: COMMERCIAL

## 2025-05-21 ENCOUNTER — APPOINTMENT (OUTPATIENT)
Dept: PRIMARY CARE | Facility: CLINIC | Age: 75
End: 2025-05-21
Payer: COMMERCIAL

## 2025-06-18 ENCOUNTER — APPOINTMENT (OUTPATIENT)
Dept: RADIOLOGY | Facility: CLINIC | Age: 75
End: 2025-06-18
Payer: COMMERCIAL

## 2025-06-26 ENCOUNTER — APPOINTMENT (OUTPATIENT)
Dept: HEMATOLOGY/ONCOLOGY | Facility: HOSPITAL | Age: 75
End: 2025-06-26
Payer: COMMERCIAL

## 2025-07-02 ENCOUNTER — APPOINTMENT (OUTPATIENT)
Dept: PRIMARY CARE | Facility: CLINIC | Age: 75
End: 2025-07-02
Payer: COMMERCIAL

## 2025-07-02 VITALS
OXYGEN SATURATION: 97 % | HEIGHT: 66 IN | HEART RATE: 60 BPM | SYSTOLIC BLOOD PRESSURE: 141 MMHG | DIASTOLIC BLOOD PRESSURE: 63 MMHG | BODY MASS INDEX: 28.81 KG/M2 | WEIGHT: 179.23 LBS | RESPIRATION RATE: 18 BRPM

## 2025-07-02 DIAGNOSIS — R73.9 HYPERGLYCEMIA: ICD-10-CM

## 2025-07-02 DIAGNOSIS — E78.00 PURE HYPERCHOLESTEROLEMIA: ICD-10-CM

## 2025-07-02 DIAGNOSIS — G62.0 CHEMOTHERAPY-INDUCED PERIPHERAL NEUROPATHY (MULTI): ICD-10-CM

## 2025-07-02 DIAGNOSIS — E04.9 GOITER, NODULAR: ICD-10-CM

## 2025-07-02 DIAGNOSIS — F41.8 OTHER SPECIFIED ANXIETY DISORDERS: ICD-10-CM

## 2025-07-02 DIAGNOSIS — C79.51 SECONDARY MALIGNANT NEOPLASM OF BONE (MULTI): Primary | ICD-10-CM

## 2025-07-02 DIAGNOSIS — M85.852 OSTEOPENIA OF NECK OF LEFT FEMUR: ICD-10-CM

## 2025-07-02 DIAGNOSIS — K21.9 GERD WITHOUT ESOPHAGITIS: ICD-10-CM

## 2025-07-02 DIAGNOSIS — M35.9 UNDIFFERENTIATED CONNECTIVE TISSUE DISEASE (MULTI): ICD-10-CM

## 2025-07-02 DIAGNOSIS — M81.0 POSTMENOPAUSAL OSTEOPOROSIS WITHOUT PATHOLOGICAL FRACTURE: ICD-10-CM

## 2025-07-02 DIAGNOSIS — E55.9 VITAMIN D DEFICIENCY, UNSPECIFIED: ICD-10-CM

## 2025-07-02 DIAGNOSIS — I10 ESSENTIAL (PRIMARY) HYPERTENSION: ICD-10-CM

## 2025-07-02 DIAGNOSIS — E53.8 B12 DEFICIENCY: ICD-10-CM

## 2025-07-02 DIAGNOSIS — T45.1X5A CHEMOTHERAPY-INDUCED PERIPHERAL NEUROPATHY (MULTI): ICD-10-CM

## 2025-07-02 DIAGNOSIS — F41.8 DEPRESSION WITH ANXIETY: ICD-10-CM

## 2025-07-02 DIAGNOSIS — Z12.31 VISIT FOR SCREENING MAMMOGRAM: ICD-10-CM

## 2025-07-02 DIAGNOSIS — Z00.00 PHYSICAL EXAM, ANNUAL: ICD-10-CM

## 2025-07-02 PROCEDURE — 3077F SYST BP >= 140 MM HG: CPT | Performed by: INTERNAL MEDICINE

## 2025-07-02 PROCEDURE — 1126F AMNT PAIN NOTED NONE PRSNT: CPT | Performed by: INTERNAL MEDICINE

## 2025-07-02 PROCEDURE — 1159F MED LIST DOCD IN RCRD: CPT | Performed by: INTERNAL MEDICINE

## 2025-07-02 PROCEDURE — 1036F TOBACCO NON-USER: CPT | Performed by: INTERNAL MEDICINE

## 2025-07-02 PROCEDURE — 3078F DIAST BP <80 MM HG: CPT | Performed by: INTERNAL MEDICINE

## 2025-07-02 PROCEDURE — 99397 PER PM REEVAL EST PAT 65+ YR: CPT | Performed by: INTERNAL MEDICINE

## 2025-07-02 RX ORDER — ACETAMINOPHEN 500 MG
125 TABLET ORAL DAILY
COMMUNITY

## 2025-07-02 RX ORDER — ESCITALOPRAM OXALATE 10 MG/1
TABLET ORAL
Qty: 135 TABLET | Refills: 1 | Status: SHIPPED | OUTPATIENT
Start: 2025-07-02

## 2025-07-02 ASSESSMENT — PAIN SCALES - GENERAL: PAINLEVEL_OUTOF10: 0-NO PAIN

## 2025-07-02 ASSESSMENT — ENCOUNTER SYMPTOMS
HEADACHES: 0
LOSS OF SENSATION IN FEET: 1
OCCASIONAL FEELINGS OF UNSTEADINESS: 1
DEPRESSION: 0
SHORTNESS OF BREATH: 0
BLOOD IN STOOL: 0
PALPITATIONS: 0
WEAKNESS: 0
LIGHT-HEADEDNESS: 0

## 2025-07-02 ASSESSMENT — PATIENT HEALTH QUESTIONNAIRE - PHQ9
SUM OF ALL RESPONSES TO PHQ9 QUESTIONS 1 AND 2: 0
2. FEELING DOWN, DEPRESSED OR HOPELESS: NOT AT ALL
1. LITTLE INTEREST OR PLEASURE IN DOING THINGS: NOT AT ALL

## 2025-07-02 NOTE — PATIENT INSTRUCTIONS
Modify activities and observe if symptoms of left arm improved if not you will orthopedics. Please start counseling. Discuss with your  provider current situation, maintain weight bearing exercise,use over the counter dry mouth lozanges and liquid, increase escitalopram to 15 m, return in 2 m

## 2025-07-02 NOTE — PROGRESS NOTES
"Subjective   Patient ID: Sunni Hearn is a 75 y.o. female who presents for Annual Exam.    HPI She is eating less than last year, due to dry mouth. 3 meals a day. She eats any kind of food. Avoids late meal to prevent gerd, avoids tomatoes and pepper. Sweets daily. ETOH none.  She walks at work 2 days a week and watches 2 grandchildren 3 days a week.  She walks weather permited in water about 2 hours.  She has been trospium with good response for urinary frequency but intense dry mouth, needs advice.  She has chronic fingers and toes numbness from chemo neuropathy.  6 months ago started with numbness and tingling  on lateral aspect of left arm, left forearm no painfully,while driving and when bending 45 degrees . Symptoms release changing position.  She is very sad and upset since her  with Parkinson  paranoia's about her.     Review of Systems   Respiratory:  Negative for shortness of breath.    Cardiovascular:  Negative for chest pain and palpitations.   Gastrointestinal:  Negative for blood in stool.        GERD asymptomatic on meds.  BM every other day no hard no blood   Endocrine: Negative for heat intolerance.   Neurological:  Negative for weakness, light-headedness and headaches.   All other systems reviewed and are negative.      Objective   /63   Pulse 60   Resp 18   Ht 1.67 m (5' 5.75\")   Wt 81.3 kg (179 lb 3.7 oz)   LMP  (LMP Unknown)   SpO2 97%   BMI 29.15 kg/m²     Physical Exam  3 lb up from 1 year ago.  Eyes - conjunctiva clear, PERRLA  HEENT - no impacted wax  Neck - No cervical lymphadenopathy,  thyromegaly  Axilla - no palpable lymphadenopathy  Breast - visual exam: no asymmetry; palpation: nontender, no palpable nodules, retractions or discharge bilaterally  Cardiac - regular rate and rhythm, no murmurs, no carotid bruit, no JVP  Lung- clear to auscultation, no rales, no rhonchi, no wheezing  GI- normally active bowel sounds, nontender, nondistended, no hepatosplenomegaly, no " rebound  MSK - toes deformities, ROM left shoulder presered  Neuro - superficial sensation decreased on left distal leg and feet, oriented x 3  Extremities - no edema, good distal arterial pulses., ganglion cyst on left dorsal aspect of food  Skin - no rash  Psychiatric - pleasant, cooperative, no hallucinations    Assessment/Plan   Assessment & Plan  Secondary malignant neoplasm of bone (Multi)         Undifferentiated connective tissue disease (Multi)         Chemotherapy-induced peripheral neuropathy (Multi)  Stable on gabapentin       Physical exam, annual  Discussed diet, exercise, immunizations, and screening appropriate for their age.  Colonoscopy: 2020  Dexa: feb 2024 , off biphosphonates, osteopenia  Mammogram: feb 2025           Pure hypercholesterolemia  Pending lipid to adjust statin  Orders:    Lipid Panel; Future    Essential (primary) hypertension  White coat , continue same meds       GERD without esophagitis  Continue current meds       Depression with anxiety  Under intense stress due to family illness ,increase escitalopram to 15 mg , re eval in 2m  ref to psychologist, discuss about reaching to  provider and family members  Orders:    Referral to Psychology; Future    Osteopenia of neck of left femur  Off biphosphonates, dexa feb 2026       Visit for screening mammogram    Orders:    BI mammo bilateral screening tomosynthesis; Future    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

## 2025-07-02 NOTE — ASSESSMENT & PLAN NOTE
Under intense stress due to family illness ,increase escitalopram to 15 mg , re eval in 2m  ref to psychologist, discuss about reaching to  provider and family members  Orders:    Referral to Psychology; Future

## 2025-07-03 ENCOUNTER — HOSPITAL ENCOUNTER (OUTPATIENT)
Dept: RADIOLOGY | Facility: CLINIC | Age: 75
Discharge: HOME | End: 2025-07-03
Payer: COMMERCIAL

## 2025-07-03 DIAGNOSIS — K21.9 GERD WITHOUT ESOPHAGITIS: ICD-10-CM

## 2025-07-03 DIAGNOSIS — C83.38 DIFFUSE LARGE B-CELL LYMPHOMA OF LYMPH NODES OF MULTIPLE REGIONS (MULTI): ICD-10-CM

## 2025-07-03 PROCEDURE — A9552 F18 FDG: HCPCS | Performed by: INTERNAL MEDICINE

## 2025-07-03 PROCEDURE — 3430000001 HC RX 343 DIAGNOSTIC RADIOPHARMACEUTICALS: Performed by: INTERNAL MEDICINE

## 2025-07-03 PROCEDURE — 78815 PET IMAGE W/CT SKULL-THIGH: CPT | Mod: PS

## 2025-07-03 RX ORDER — FLUDEOXYGLUCOSE F 18 200 MCI/ML
13.06 INJECTION, SOLUTION INTRAVENOUS
Status: COMPLETED | OUTPATIENT
Start: 2025-07-03 | End: 2025-07-03

## 2025-07-03 RX ADMIN — FLUDEOXYGLUCOSE F 18 13.06 MILLICURIE: 200 INJECTION, SOLUTION INTRAVENOUS at 07:50

## 2025-07-24 ENCOUNTER — APPOINTMENT (OUTPATIENT)
Dept: HEMATOLOGY/ONCOLOGY | Facility: HOSPITAL | Age: 75
End: 2025-07-24
Payer: COMMERCIAL

## 2025-07-31 ENCOUNTER — OFFICE VISIT (OUTPATIENT)
Dept: HEMATOLOGY/ONCOLOGY | Facility: HOSPITAL | Age: 75
End: 2025-07-31
Payer: COMMERCIAL

## 2025-07-31 ENCOUNTER — LAB (OUTPATIENT)
Dept: LAB | Facility: HOSPITAL | Age: 75
End: 2025-07-31
Payer: COMMERCIAL

## 2025-07-31 VITALS
BODY MASS INDEX: 28.93 KG/M2 | DIASTOLIC BLOOD PRESSURE: 82 MMHG | SYSTOLIC BLOOD PRESSURE: 161 MMHG | OXYGEN SATURATION: 99 % | RESPIRATION RATE: 14 BRPM | HEART RATE: 72 BPM | TEMPERATURE: 96.8 F | WEIGHT: 177.9 LBS

## 2025-07-31 DIAGNOSIS — C83.38 DIFFUSE LARGE B-CELL LYMPHOMA OF LYMPH NODES OF MULTIPLE REGIONS (MULTI): ICD-10-CM

## 2025-07-31 LAB
25(OH)D3 SERPL-MCNC: 38 NG/ML (ref 30–100)
ALBUMIN SERPL BCP-MCNC: 4.1 G/DL (ref 3.4–5)
ALP SERPL-CCNC: 53 U/L (ref 33–136)
ALT SERPL W P-5'-P-CCNC: 11 U/L (ref 7–45)
ANION GAP SERPL CALC-SCNC: 12 MMOL/L (ref 10–20)
AST SERPL W P-5'-P-CCNC: 13 U/L (ref 9–39)
BASOPHILS # BLD AUTO: 0.02 X10*3/UL (ref 0–0.1)
BASOPHILS NFR BLD AUTO: 0.4 %
BILIRUB SERPL-MCNC: 0.5 MG/DL (ref 0–1.2)
BUN SERPL-MCNC: 18 MG/DL (ref 6–23)
CALCIUM SERPL-MCNC: 9.6 MG/DL (ref 8.6–10.3)
CHLORIDE SERPL-SCNC: 108 MMOL/L (ref 98–107)
CO2 SERPL-SCNC: 26 MMOL/L (ref 21–32)
CREAT SERPL-MCNC: 0.7 MG/DL (ref 0.5–1.05)
EGFRCR SERPLBLD CKD-EPI 2021: 89 ML/MIN/1.73M*2
EOSINOPHIL # BLD AUTO: 0.15 X10*3/UL (ref 0–0.4)
EOSINOPHIL NFR BLD AUTO: 2.8 %
ERYTHROCYTE [DISTWIDTH] IN BLOOD BY AUTOMATED COUNT: 15.4 % (ref 11.5–14.5)
EST. AVERAGE GLUCOSE BLD GHB EST-MCNC: 120 MG/DL
GLUCOSE SERPL-MCNC: 102 MG/DL (ref 74–99)
HBA1C MFR BLD: 5.8 % (ref ?–5.7)
HCT VFR BLD AUTO: 38.9 % (ref 36–46)
HGB BLD-MCNC: 12 G/DL (ref 12–16)
IMM GRANULOCYTES # BLD AUTO: 0.03 X10*3/UL (ref 0–0.5)
IMM GRANULOCYTES NFR BLD AUTO: 0.6 % (ref 0–0.9)
LDH SERPL L TO P-CCNC: 136 U/L (ref 84–246)
LYMPHOCYTES # BLD AUTO: 1.27 X10*3/UL (ref 0.8–3)
LYMPHOCYTES NFR BLD AUTO: 23.8 %
MCH RBC QN AUTO: 26.5 PG (ref 26–34)
MCHC RBC AUTO-ENTMCNC: 30.8 G/DL (ref 32–36)
MCV RBC AUTO: 86 FL (ref 80–100)
MONOCYTES # BLD AUTO: 0.59 X10*3/UL (ref 0.05–0.8)
MONOCYTES NFR BLD AUTO: 11 %
NEUTROPHILS # BLD AUTO: 3.28 X10*3/UL (ref 1.6–5.5)
NEUTROPHILS NFR BLD AUTO: 61.4 %
NRBC BLD-RTO: 0 /100 WBCS (ref 0–0)
PLATELET # BLD AUTO: 189 X10*3/UL (ref 150–450)
POTASSIUM SERPL-SCNC: 4.1 MMOL/L (ref 3.5–5.3)
PROT SERPL-MCNC: 6.9 G/DL (ref 6.4–8.2)
RBC # BLD AUTO: 4.53 X10*6/UL (ref 4–5.2)
SODIUM SERPL-SCNC: 142 MMOL/L (ref 136–145)
T4 FREE SERPL-MCNC: 1.09 NG/DL (ref 0.78–1.48)
TSH SERPL-ACNC: 0.34 MIU/L (ref 0.44–3.98)
VIT B12 SERPL-MCNC: 398 PG/ML (ref 211–911)
WBC # BLD AUTO: 5.3 X10*3/UL (ref 4.4–11.3)

## 2025-07-31 PROCEDURE — 85025 COMPLETE CBC W/AUTO DIFF WBC: CPT

## 2025-07-31 PROCEDURE — 80053 COMPREHEN METABOLIC PANEL: CPT

## 2025-07-31 PROCEDURE — 83036 HEMOGLOBIN GLYCOSYLATED A1C: CPT | Performed by: INTERNAL MEDICINE

## 2025-07-31 PROCEDURE — 84443 ASSAY THYROID STIM HORMONE: CPT | Performed by: INTERNAL MEDICINE

## 2025-07-31 PROCEDURE — 3077F SYST BP >= 140 MM HG: CPT

## 2025-07-31 PROCEDURE — 99214 OFFICE O/P EST MOD 30 MIN: CPT

## 2025-07-31 PROCEDURE — 84439 ASSAY OF FREE THYROXINE: CPT | Performed by: INTERNAL MEDICINE

## 2025-07-31 PROCEDURE — 1126F AMNT PAIN NOTED NONE PRSNT: CPT

## 2025-07-31 PROCEDURE — 82306 VITAMIN D 25 HYDROXY: CPT | Performed by: INTERNAL MEDICINE

## 2025-07-31 PROCEDURE — 82607 VITAMIN B-12: CPT | Performed by: INTERNAL MEDICINE

## 2025-07-31 PROCEDURE — 3079F DIAST BP 80-89 MM HG: CPT

## 2025-07-31 PROCEDURE — 83615 LACTATE (LD) (LDH) ENZYME: CPT

## 2025-07-31 ASSESSMENT — ENCOUNTER SYMPTOMS
APPETITE CHANGE: 0
ABDOMINAL PAIN: 0
DYSURIA: 0
FATIGUE: 0
NAUSEA: 0
ADENOPATHY: 0
CONSTIPATION: 0
COUGH: 0
DIARRHEA: 0
LEG SWELLING: 0
WHEEZING: 0
SHORTNESS OF BREATH: 0
VOMITING: 0
CHILLS: 0
FREQUENCY: 0
PALPITATIONS: 0
WOUND: 0
BRUISES/BLEEDS EASILY: 0
FEVER: 0

## 2025-07-31 ASSESSMENT — PAIN SCALES - GENERAL: PAINLEVEL_OUTOF10: 0-NO PAIN

## 2025-07-31 NOTE — ASSESSMENT & PLAN NOTE
#Lymphoma   Final diagnosis is DLBCL. FISH tests for BCL2 and MYC translocations are pending.   Stage IV.   IPI 3 (age, extranodal sites, stage).  Reviewed the results, natural history of DLBCL. stressed it is very treatable, and potentially curable in up to 70% patients.   Discussed PET/CT results. It's a CR. Very encouraging news. Will need follow up PET/CT q6mon.  -discussed results of PET done on jan 5, 2024. It confirms CR. Next PET/CT will be due on July 2024.   -Will need to repeat PET/CT in July/Aug 2024. Due to extranodal involvement, CT less informative.   -10/15/2024: discussed PET/CT of 10/4, showing CR. Labs WNL. Will continue to monitor.   12/26/2024: Lab results are WNL, no clinical sign of progression. Surprisingly sensory neuropathy in the hands has increased, likely due to stopping gabapentin. She has resumed gabapentin, and is getting scrambler therapy (electrical stimulation). Will monitor and repeat PET/CT in June 2025.   -7/31/25: Had PET/CT on 7/3. Discussed FDG uptake in spleen with Dr Rawls. Very minimal uptake likely reactive. PET/CT continues to confirm CR. Can repeat scan in 6 mon-1 year if indicated. Has mild FDG avid left thyroid nodule in PET/CT. Patient has known nodule and continues to follow with PCP. Nodule stable from last scan in Oct 2024. Labs WNL. This marks 2 year remission lety.      #treatment  The goal is cure. The treatment recommended is R-CHOP x 6 cycles. given q3wks. She will need GCSF and other supportive care.   AEs of drugs explained in details. In particular, the following possible AEs were discussed: severe infection, death, PML, HBV reactivation, cardiac injury, renal injury, mucositis, neuropathy (sensory, motor, autonomic), secondary tumors including leukemia.  These may happen at various frequency. will make best efforts to monitor, prevent, and treat AEs.    c1 was associated with nausea and vomiting. Pt should take zofran for 5 days at the scheduled twice a  day doses, instead of the as needed dose schedule.  C2 given on 3/24/2023 was well tolerated. G1 sensory neuropathy was reported. No need to adjust vinca dose.  C3 given 4/14/23  C4 given 5/5/23 Vincristine dose reduced with C4 for grade 2 neuropathy.   C5 given 5/26/23   C6 given 6/16/23  Discussed IT therapy. Overall the AE is rare and is well tolerated. She consented and will start treatment on 8/18 and 9/11. He will need CT head, and labs before these. She received one, but is not willing to have 2nd. CSF on 8/18 was negative, with no evidence of lymphoma.

## 2025-07-31 NOTE — PROGRESS NOTES
Patient ID: Sunni Hearn is a 75 y.o. female.       ASSESSMENT & PLAN     Oncology History    No history exists.       07/31/25 Here today for routine 6 mon follow up for DLBCL.     Assessment & Plan  Diffuse large B-cell lymphoma of lymph nodes of multiple regions (Multi)  #Lymphoma   Final diagnosis is DLBCL. FISH tests for BCL2 and MYC translocations are pending.   Stage IV.   IPI 3 (age, extranodal sites, stage).  Reviewed the results, natural history of DLBCL. stressed it is very treatable, and potentially curable in up to 70% patients.   Discussed PET/CT results. It's a CR. Very encouraging news. Will need follow up PET/CT q6mon.  -discussed results of PET done on jan 5, 2024. It confirms CR. Next PET/CT will be due on July 2024.   -Will need to repeat PET/CT in July/Aug 2024. Due to extranodal involvement, CT less informative.   -10/15/2024: discussed PET/CT of 10/4, showing CR. Labs WNL. Will continue to monitor.   12/26/2024: Lab results are WNL, no clinical sign of progression. Surprisingly sensory neuropathy in the hands has increased, likely due to stopping gabapentin. She has resumed gabapentin, and is getting scrambler therapy (electrical stimulation). Will monitor and repeat PET/CT in June 2025.   -7/31/25: Had PET/CT on 7/3. Discussed FDG uptake in spleen with Dr Rawls. Very minimal uptake likely reactive. PET/CT continues to confirm CR. Can repeat scan in 6 mon-1 year if indicated. Has mild FDG avid left thyroid nodule in PET/CT. Patient has known nodule and continues to follow with PCP. Nodule stable from last scan in Oct 2024. Labs WNL. This marks 2 year remission lety.      #treatment  The goal is cure. The treatment recommended is R-CHOP x 6 cycles. given q3wks. She will need GCSF and other supportive care.   AEs of drugs explained in details. In particular, the following possible AEs were discussed: severe infection, death, PML, HBV reactivation, cardiac injury, renal injury, mucositis,  neuropathy (sensory, motor, autonomic), secondary tumors including leukemia.  These may happen at various frequency. will make best efforts to monitor, prevent, and treat AEs.    c1 was associated with nausea and vomiting. Pt should take zofran for 5 days at the scheduled twice a day doses, instead of the as needed dose schedule.  C2 given on 3/24/2023 was well tolerated. G1 sensory neuropathy was reported. No need to adjust vinca dose.  C3 given 4/14/23  C4 given 5/5/23 Vincristine dose reduced with C4 for grade 2 neuropathy.   C5 given 5/26/23   C6 given 6/16/23  Discussed IT therapy. Overall the AE is rare and is well tolerated. She consented and will start treatment on 8/18 and 9/11. He will need CT head, and labs before these. She received one, but is not willing to have 2nd. CSF on 8/18 was negative, with no evidence of lymphoma.     Plan   -Dr Rawls follow up in 6 mon  -Advised to call answering service for any questions or concerns    SUBJECTIVE     HPI    The patient has a long history of joint and back pain, and possible autoimmune disease. In November 2022, she experience pain in the right hip, which was  new. Over the next few months, it progressed to cause moderate to severe pain in the right foot, leg, thigh, and hip, most severely affecting the foot. She was given various treatments, including gabapentin, roboxin, steroid. However, none has helped.  In addition she underwent CT, MRI, and later PET/CT to work up the pain (Imani Llanes, Raj[facility], [facility]). Notably, PET/CT on 1/19/2023 shows multiple FDG avid lymph nodes, soft tissue mass, osseous foci, and spleen. These were suspicious for malignancy,  particularly lymphoma. A biopsy was done on Jan 30, 2023,  on BONE, ILIAC, RIGHT, which was FDG avid. An initial biopsy of the bone was not diagnostic. She subsequently had another biopsy for a preverbebral  mass. Final diagnosis is DLBCL. FISH tests for BCL2 and MYC translocations are pending. Stage IV. IPI  3 (age, extranodal sites, stage).      She initiated therapy with  R-CHOP on 3/3/2023. Had significant nausea and vomiting for the first 3 days post chemo, relieved with iv Zofran. subsequently treatments were well tolerated. No  dose adjustment was needed. C6 was given on 6/16/2023.       Continues to report urinary incontinence. Was evaluated by Urology who recommended bladder mesh surgery. She has consented to this option and will have the procedure on 8/25.      She had a IT on 8/18. The procedure was not well tolerated, and she is not willing to have more. CSF results shows no evidence of lymphoma.      10/15/2024 she states she has improved in general. Numbness in the right foot and fingertips are improving without meds. Off gabapentin now. Denies fever, chills, night sweats, weight loss, headaches, visual disturbances.  12/26/2024: recently her neuropathy has worsened. She is getting acupuncture, and resumed gabapentin, using PT and a scrambler therapy (electrical stimulation). Denies fever, chills, night sweats, weight loss, headaches, visual disturbances.        Treatment synopsis: [facility] 3/3 to 6/16/2023    Sunni Hearn presents today for follow up, accompanied by her . Overall feeling well. Still having some neuropathy in feet. Otherwise no complaints.     Did state she was in [place] right before she got her PET/CT and was getting over a virus. Unsure what type of virus she had.     Eating and drinking well. Staying active.     Has ganglion cyst on L ankle she previously had drained that came back. Wants to have removed again.     No fevers, chills, nausea, vomiting, diarrhea, rashes, bruising or bleeding.     Review of Systems   Constitutional:  Negative for appetite change, chills, fatigue and fever.   HENT:   Negative for mouth sores.    Respiratory:  Negative for cough, shortness of breath and wheezing.    Cardiovascular:  Negative for chest pain, leg swelling and palpitations.    Gastrointestinal:  Negative for abdominal pain, constipation, diarrhea, nausea and vomiting.   Genitourinary:  Negative for dysuria and frequency.    Skin:  Negative for itching, rash and wound.   Neurological:         Neuropathy in feet   Hematological:  Negative for adenopathy. Does not bruise/bleed easily.       Medical History[1]  Social History[2]   Surgical History[3]      OBJECTIVE     BSA: There is no height or weight on file to calculate BSA.  LMP  (LMP Unknown)        Physical Exam  Constitutional:       Appearance: Normal appearance.     Eyes:      Extraocular Movements: Extraocular movements intact.      Pupils: Pupils are equal, round, and reactive to light.       Cardiovascular:      Rate and Rhythm: Normal rate and regular rhythm.      Pulses: Normal pulses.      Heart sounds: Normal heart sounds.   Pulmonary:      Effort: Pulmonary effort is normal. No respiratory distress.      Breath sounds: Normal breath sounds. No wheezing.   Abdominal:      General: There is no distension.      Palpations: Abdomen is soft.      Tenderness: There is no abdominal tenderness.     Musculoskeletal:         General: Normal range of motion.      Cervical back: Normal range of motion.      Right lower leg: No edema.      Left lower leg: No edema.      Comments: Ganglion cyst like lesion on L ankle.    Lymphadenopathy:      Cervical: No cervical adenopathy.      Upper Body:      Right upper body: No supraclavicular or axillary adenopathy.      Left upper body: No supraclavicular or axillary adenopathy.      Lower Body: No right inguinal adenopathy. No left inguinal adenopathy.     Skin:     General: Skin is warm.      Coloration: Skin is not jaundiced.      Findings: No bruising or lesion.     Neurological:      General: No focal deficit present.      Mental Status: She is alert and oriented to person, place, and time.     Psychiatric:         Mood and Affect: Mood normal.         Behavior: Behavior normal.          Thought Content: Thought content normal.         Judgment: Judgment normal.         Performance Status:  Karnofsky Score: 100 - Fully active, able to carry on all pre-disease performed without restriction           Bree Whittington PA-C                    [1]   Past Medical History:  Diagnosis Date    Acute candidiasis of vulva and vagina     Vaginal yeast infection    Acute vaginitis 04/21/2016    Acute vaginitis    Anemia     Anemia due to chemotherapy 11/17/2023    Anxiety     COVID-19 12/01/2020    COVID-19    Dizziness     GERD (gastroesophageal reflux disease)     Hx antineoplastic chemo 2023    Hyperlipidemia     Hypertension     Localized swelling, mass and lump, left lower limb 02/15/2019    Ankle mass, left    Non Hodgkin's lymphoma     Orthostatic lightheadedness 11/17/2023    Other bursal cyst, unspecified site 03/07/2019    Synovial cyst    Other conditions influencing health status 06/06/2013    Nontoxic Goiter    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    Pain in right knee 12/23/2015    Knee pain, right    Personal history of diseases of the blood and blood-forming organs and certain disorders involving the immune mechanism 12/10/2015    History of anemia    Personal history of other diseases of the circulatory system 04/07/2016    History of sinus tachycardia    Personal history of other diseases of the musculoskeletal system and connective tissue 06/20/2013    History of tendinitis    Personal history of other diseases of the respiratory system 11/18/2020    History of acute sinusitis    Personal history of other mental and behavioral disorders 01/14/2021    History of anxiety disorder    Personal history of other specified conditions 04/07/2016    History of bradycardia    Personal history of other specified conditions 12/01/2020    History of fatigue    Personal history of other specified conditions 12/10/2015    History of dysuria    Personal history of  urinary (tract) infections 2018    History of urinary tract infection    Posterior subcapsular polar age-related cataract, bilateral 2024    Preglaucoma, unspecified, bilateral 2015    Glaucoma suspect, both eyes    Preoperative examination 2024    Presence of spectacles and contact lenses 2015    Uses contact lenses    Primary open-angle glaucoma, bilateral, mild stage 10/12/2015    Primary open angle glaucoma of both eyes, mild stage    Primary open-angle glaucoma, bilateral, mild stage 10/12/2015    Primary open angle glaucoma of both eyes, mild stage    Primary open-angle glaucoma, bilateral, mild stage 10/12/2015    Primary open angle glaucoma of both eyes, mild stage    Primary open-angle glaucoma, bilateral, mild stage 10/13/2015    Primary open angle glaucoma of both eyes, mild stage    Primary open-angle glaucoma, unspecified eye, stage unspecified 10/13/2015    POAG (primary open-angle glaucoma)    Right lower quadrant pain 2015    Abdominal pain, right lower quadrant   [2]   Social History  Tobacco Use    Smoking status: Never    Smokeless tobacco: Never   Substance Use Topics    Alcohol use: Never    Drug use: Never   [3]   Past Surgical History:  Procedure Laterality Date     SECTION, CLASSIC  2013     Section    CT GUIDED PERCUTANEOUS BIOPSY BONE DEEP  2023    CT GUIDED PERCUTANEOUS BIOPSY BONE DEEP 2023 [facility]    CT GUIDED PERCUTANEOUS BIOPSY BONE DEEP  2/10/2023    CT GUIDED PERCUTANEOUS BIOPSY BONE DEEP 2/10/2023 DOCTOR OFFICE LEGACY    OTHER SURGICAL HISTORY  2021    Knee replacement

## 2025-08-06 DIAGNOSIS — I10 ESSENTIAL (PRIMARY) HYPERTENSION: ICD-10-CM

## 2025-08-06 RX ORDER — AMLODIPINE BESYLATE 10 MG/1
TABLET ORAL EVERY 12 HOURS
Qty: 90 TABLET | Refills: 0 | Status: SHIPPED | OUTPATIENT
Start: 2025-08-06

## 2025-09-03 ENCOUNTER — APPOINTMENT (OUTPATIENT)
Dept: PRIMARY CARE | Facility: CLINIC | Age: 75
End: 2025-09-03
Payer: COMMERCIAL

## 2025-10-15 ENCOUNTER — APPOINTMENT (OUTPATIENT)
Dept: PRIMARY CARE | Facility: CLINIC | Age: 75
End: 2025-10-15
Payer: COMMERCIAL

## (undated) DEVICE — TIP, SUCTION, YANKAUER, BULB, OPEN TIP, W/O CONTROL VENT, LF, CLEAR

## (undated) DEVICE — PAD, GROUNDING, ELECTROSURGICAL, W/9 FT CABLE, POLYHESIVE II, ADULT, LF

## (undated) DEVICE — DRAPE PACK, LAVH, W/ATTACHED LEGGINGS, W/POUCH, 100 X 114 IN, LF, STERILE

## (undated) DEVICE — GOWN, ASTOUND, L

## (undated) DEVICE — SUTURE, VICRYL, 0, 27 IN, CT-2, UNDYED

## (undated) DEVICE — SUTURE, MONOCRYL, 4-0, 18 IN, PS2, UNDYED

## (undated) DEVICE — RETRACTOR, SURGICAL, RING, PLASTIC, DISPOSABLE

## (undated) DEVICE — PREP, SCRUB, SKIN, FOAM, HIBICLENS, 4 OZ

## (undated) DEVICE — SYRINGE, HYPODERMIC, CONTROL, LUER LOCK, 10 CC, PLASTIC, STERILE

## (undated) DEVICE — SOLUTION, IRRIGATION, SODIUM CHLORIDE 0.9%, 1000 ML, POUR BOTTLE

## (undated) DEVICE — LUBRICANT, WATER SOLUBLE, BACTERIOSTATIC, 2 OZ, STERILE

## (undated) DEVICE — PREP TRAY, SKIN, DRY, W/GLOVES

## (undated) DEVICE — NEEDLE, SPINAL, 20 G X 3.5 IN, YELLOW HUB

## (undated) DEVICE — Device

## (undated) DEVICE — SPONGE, GAUZE, XRAY DECT, 16 PLY, 4 X 4, W/MASTER DMT,STERILE

## (undated) DEVICE — PAD, SANITARY, OBSTETRICAL, W/ADHSV STRIP,11 IN,LF

## (undated) DEVICE — COVER HANDLE LIGHT, STERIS, BLUE, STERILE

## (undated) DEVICE — GLOVE, PROTEXIS PI CLASSIC, SZ-7.5, PF, LF

## (undated) DEVICE — ADHESIVE, SKIN, LIQUIBAND EXCEED

## (undated) DEVICE — SLING, DESARA, BLUE TV, WITH 2.7 INTRODUCER

## (undated) DEVICE — IRRIGATION SET, CYSTOSCOPY, REGULATING CLAMP, STRAIGHT, 81 IN